# Patient Record
Sex: FEMALE | Race: WHITE | Employment: OTHER | URBAN - METROPOLITAN AREA
[De-identification: names, ages, dates, MRNs, and addresses within clinical notes are randomized per-mention and may not be internally consistent; named-entity substitution may affect disease eponyms.]

---

## 2017-02-17 PROBLEM — E11.9 CONTROLLED TYPE 2 DIABETES MELLITUS WITHOUT COMPLICATION, WITHOUT LONG-TERM CURRENT USE OF INSULIN (HCC): Status: ACTIVE | Noted: 2017-02-17

## 2017-06-30 PROBLEM — E11.9 CONTROLLED TYPE 2 DIABETES MELLITUS WITHOUT COMPLICATION, WITHOUT LONG-TERM CURRENT USE OF INSULIN (HCC): Status: RESOLVED | Noted: 2017-02-17 | Resolved: 2017-06-30

## 2018-02-23 NOTE — H&P
>      Patient:  Avila Fodr  YOB: 1945   Date:                       02/01/2018 1:15 PM   Visit Type:                 Office Visit        This 68year old female presents for GERD, IBS, history of colon polyp and history of gastric polyp. History of Present Illness:  1. GERD   2. IBS   3.  history of colon polyp   4.  history of gastric polyp   Ms. Prince Quick is a 72-year-old female with a past medical history of diabetes, obesity, chronic kidney disease stage III, asthma, hypertension and hyperlipidemia followed long-term by Dr. Giuseppe Koroma for IBS and GERD. She also has a history of colon polyps and gastric polyps. Her last colonoscopy done in December 2013 revealed diverticulosis and colon polyps. EGD in 2012 revealed a large hyperplastic polyp. She is due for both surveillance EGD and colonoscopy this month. The patient states that over the past few months she has been having increased issues with bloating which has been made better by eating [place]. She recently stopped taking omeprazole 5 or 6 weeks ago because of stage III kidney disease and now she is taking only Zantac 150 b.i.d. for GERD symptoms. She is having lots of breakthrough heartburn and eating \"lots of TUMS\". she does have to do a two-day prepwith her colonoscopies and has had problems with low blood pressure and passing out with previous preps. PROBLEM LIST:     Problem Description Onset Date Chronic Clinical Status Notes   Gastro-esophageal reflux disease without esophagitis 01/19/2013 Y  GERD, chronic before 2000. Prilosec 40 mg Jan 2014. IBS - Irritable bowel syndrome 01/19/2013 Y  IBS since 2000. Postprandial diarrhea. Levbid helps. Colonic polyps 01/19/2013 Y  Hx colon polyps. (Had hypotension, dizzy with 2013 colon prep). Next colon 2015 w white rice diet 2 day prior, then full prep day prior, pediscope and APC backup and Miralax prep (limit the salt intake due to renal disease).    Colonic diverticulitis 01/19/2013 Y    Hx diverticulitis. July 2002. Sept 2012. Aug 2013. Multiple gastric polyps 08/28/2014 N           PAST MEDICAL/SURGICAL HISTORY   (Detailed)    Disease/disorder Onset Date Management Date Comments   Colonoscopy 12-5-13 (Hx polyps)  Dr Regan Bowman 2013 Tacked sigmoid, diverticulosis, 6 mm TA polyp (R colon and transverse colon), 6 mm HP rectal polyp. Cecal area clear. Colonoscopy 11-30-12 (LLQP Hx diverticulitis)  Dr Regan Bowman 2012 Tacked sigmoid w diverticulosis. Flat 8 mm TA polyp behind ICV and two other R colon TA polyps. EGD 11-30-12 (GERD)  Dr Regan Bowman 2012 Gastric HP polyps. Colonoscopy 3-31-09 (Hx polyps)  Dr Regan Bowman 2009 Diverticulosis, tacked sigmoid o/w (-). Colonoscopy 1-3-07 (Hx polyps)  Dr Regan Bowman 2007 Diverticulosis, HP and TA polyp. EGD 1-3-07 (GERD)  Dr Regan Bowman 2007 Normal.   CKD, stage III Proteinuria  Dr Denice Rahman 2006    Colonoscopy 11-30-04 (Hx polyps)  Dr Regan Bowman 2004 Diverticulosis, 2 TA polyps. Colonoscopy 11-22-02 (abd pain)  Dr Regan Bowman 2002 Diverticulosis, 3 small TA polyps. Lymph Node resection (neck)   2002 Benign reactive lymphadenopathy (after skin allergy flare up)   Endometriosis   1982 ASHWINI, BSO. Hyperlipidemia       Hypertension       L breast lumpectomy x 3    Before 2002. Diabetes mellitus, type 2  Dr Olivier [facility] 08/28/2014 -Better control with new meds (summer 2014). EGD 2-2-16 (dysphagia, GERD)    Large fundic gland polyp (removed by Bx the stalk). Meribeth Colon #50: no tear. Asthma       Colonoscopy 2-5-16 (Hx polyps)  Dr Regan Bowman  Diverticulosis; 3 polyps (TA, serrated polyp). Nephrolithiasis       Osteoarthritis       Skin Rashes    Patient believes from multiple food or environmental allergies: associated abd cramping pain, diarrhea.    Procedure History  Test Date Results Interp   COLONOSCOPY 11/30/2012 Diverticulosis, Colon polyps    EGD 11/30/2012 Gastric polyp            CURRENT MEDICATIONS  Medication Name Sig Desc   Lantus 100 unit/mL subcutaneous solution INJECT 50 UNITS NIGHTLY   Novolog 100 unit/mL subcutaneous solution INJECTED PER SLIDING SCALE   Tranxene T-Tab 7.5 mg tablet TAKE AS DIRECTED   Vitamin D3 2,000 unit tablet TAKE 1 TABLET DAILY   Zyrtec 10 mg tablet take 1 tablet by oral route  every day   atorvastatin take 1 tablet by oral route  every day   Symbicort inhale 2 puff by inhalation route 2 times every day in the morning and evening   omeprazole 40 mg capsule,delayed release take 1 capsule by oral route  every day before a meal   losartan 100 mg tablet take 1 tablet by oral route  every day   Victoza 2-Patric 0.6 mg/0.1 mL (18 mg/3 mL) subcutaneous pen injector inject 1.8 milliliter by subcutaneous route  every day   glipizide 5 mg tablet take 1 tablet by oral route  every day before meals   Bystolic 10 mg tablet take 1 Tablet by oral route  every day   Patanase 0.6 % nasal spray use as needed. Probiotic take 1 tablet daily. Tylenol Ex Str Arthritis Pain 500 mg tablet monet 2 tablets daily. gemfibrozil 600 mg tablet take 1 tablet by oral route 2 times every day 30 minutes before morning and evening meal   estradiol 1 mg tablet take 1/2 tablet daily. aspirin 81 mg chewable tablet chew 1 tablet (81MG)  by oral route  every day   MULTIVITAMIN 1 PO DAILY     Medication Reconciliation  Medications reconciled today. ALLERGIES  Ingredient Reaction Medication Name Comment   LOVASTATIN   ALTOCOR. LATEX      STATINS-HMG-COA REDUCTASE INHIBITORS   STATIN DRUGS   [employer]   MULTIPLE. MULTIPLE FOOD AND ENVIRONMENTAL ALLERGIES   Reviewed, updated. Family History  (Detailed)  Relationship  Age at Death Condition Onset Age Cause of Death   Father   Cancer, renal cell  N   Mother   Hypertension  N   Mother   Stroke  N         Social History:  (Detailed)  Preferred language is English. MARITAL STATUS/FAMILY/SOCIAL SUPPORT  Currently .       SMOKING STATUS  Use Status Type Smoking Status Usage Per Day Years Used Total Pack Years no/never  Never smoker      no/never  Never smoker          HOME ENVIRONMENT/SAFETY  The patient has not fallen in the last year. Review of Systems  System Neg/Pos Details   GI Positive Abdominal pain, Constipation, Diarrhea, Heartburn, Reflux. All other review of systems are negative. VITAL SIGNS  BP mm/Hg Pulse/min Resp/min Temp F Height (Total in.) Weight (lbs.) BMI   120/68 88 24  59.00 175.20 35.39     PHYSICAL EXAM    GENERAL: A well nourished, well hydrated patient who appears the stated age. SKIN: Extremities and face reveal no rashes. HEENT: Sclerae anicteric. No oral ulcerations or abnormal pigment of the lips. Pupils equal and reactive to light. CARDIOVASCULAR: Regular rate and rhythm. No murmurs, gallops or rubs. RESPIRATORY: Comfortable breathing with no accessory muscle use. Clear breath sounds with no rales or wheezes. GI: The abdomen is soft, nondistended, and nontender. Normal active bowel sounds. No enlargement of the liver or spleen. No masses palpable. Obese  RECTAL: Deferred. MUSCULOSKELETAL: Gait appears steady. NEUROLOGICAL: Gross memory appears intact. Patient is alert and oriented. PSYCHIATRIC: Mood appears appropriate with judgement intact. Medications Prescribed/Renewed (this encounter)  Medication Directions   sucralfate 100 mg/mL oral suspension take 10 milliliter by oral route 4 times every day on an empty stomach 1 hour before meals and at bedtime       Assessment/Plan  # Detail Type Description    1. Assessment Personal history of colonic polyps (Z86.010). Plan Orders Further diagnostic evaluations ordered today include(s) Colonoscopy to be performed. 2. Assessment History of gastric polyp (Z87.19). 3. Assessment Chronic GERD (K21.9). 4. Assessment Irritable bowel syndrome, unspecified type (K58.9). Provider Plan the patient is a 63-year-old female with multiple medical issues followed by Dr. Chet Paige for IBS and GERD.  She's currently due for both EGD and colonoscopy because of a history of polyps in both stomach and colon. we will schedule EGD and colonoscopy with a 2 day clear liquid prep, early a.m. case to be done at the hospital because of previous issues with hypotension. We will give her a trial of Carafate a.c. and h.s. for 2 weeks to take in addition to the Zantac b.i.d.. GERD handout provided. I have discussed the risks and benefits of the procedure including but not limited to bleeding, infection, injury, or aggravation of underlying medical issues. The pt voices understanding and agrees with proceeding. Further recommendations pending endoscopic findings and clinical course. I have discussed the need to avoid anti inflammatory medications, vit E, or fish oil for 5 days prior to the procedure. Medications have been reviewed with adjustment of any potential diabetic meds or prescriptions blood thinner as applicable. ASA class III. COUNSELING / EDUCATIONAL FACTORS:  Items reviewed / discussed during today's visit: prior testing / procedures were reviewed; testing was discussed in detail; old records were reviewed; indications and risks of the procedure were discussed; advised to follow up if symptoms worsen or do not completely resolve. Patient expressed understanding of instructions. The patient was checked out at 1:43 PM by Kena Martins. Elements of this note may have been dictated using speech recognition software. As a result, errors of speech recognition may have occurred.       Provider:      Eleazar Wilkerson 02/01/2018 1:49 PM   Document generated by: Gretchen Garcia 02/01/2018    CC Providers:  [facility] ([facility])  [address] [place] [address] MD Alban Zamorano Endocrinology  [facility], [facility] [address] [address] [place] [address]. [place] [address] [facility]  [place], [place] [address]-        Electronically signed by Simi Hogan.  Josh Yepez on 02/01/2018 01:49 PM

## 2018-02-26 ENCOUNTER — ANESTHESIA EVENT (OUTPATIENT)
Dept: ENDOSCOPY | Age: 73
End: 2018-02-26
Payer: MEDICARE

## 2018-02-26 RX ORDER — SODIUM CHLORIDE, SODIUM LACTATE, POTASSIUM CHLORIDE, CALCIUM CHLORIDE 600; 310; 30; 20 MG/100ML; MG/100ML; MG/100ML; MG/100ML
100 INJECTION, SOLUTION INTRAVENOUS CONTINUOUS
Status: CANCELLED | OUTPATIENT
Start: 2018-02-26

## 2018-02-26 RX ORDER — SODIUM CHLORIDE 0.9 % (FLUSH) 0.9 %
5-10 SYRINGE (ML) INJECTION AS NEEDED
Status: CANCELLED | OUTPATIENT
Start: 2018-02-26

## 2018-02-27 ENCOUNTER — HOSPITAL ENCOUNTER (OUTPATIENT)
Age: 73
Setting detail: OUTPATIENT SURGERY
Discharge: HOME | End: 2018-02-27
Attending: INTERNAL MEDICINE | Admitting: INTERNAL MEDICINE
Payer: MEDICARE

## 2018-02-27 ENCOUNTER — ANESTHESIA (OUTPATIENT)
Dept: ENDOSCOPY | Age: 73
End: 2018-02-27
Payer: MEDICARE

## 2018-02-27 VITALS
BODY MASS INDEX: 34.27 KG/M2 | RESPIRATION RATE: 18 BRPM | WEIGHT: 170 LBS | SYSTOLIC BLOOD PRESSURE: 138 MMHG | HEIGHT: 59 IN | OXYGEN SATURATION: 96 % | HEART RATE: 61 BPM | TEMPERATURE: 97.7 F | DIASTOLIC BLOOD PRESSURE: 75 MMHG

## 2018-02-27 LAB — GLUCOSE BLD STRIP.AUTO-MCNC: 209 MG/DL (ref 65–100)

## 2018-02-27 PROCEDURE — 82962 GLUCOSE BLOOD TEST: CPT

## 2018-02-27 PROCEDURE — 74011250636 HC RX REV CODE- 250/636: Performed by: ANESTHESIOLOGY

## 2018-02-27 PROCEDURE — 76040000025: Performed by: INTERNAL MEDICINE

## 2018-02-27 PROCEDURE — 77030009426 HC FCPS BIOP ENDOSC BSC -B: Performed by: INTERNAL MEDICINE

## 2018-02-27 PROCEDURE — 74011250636 HC RX REV CODE- 250/636

## 2018-02-27 PROCEDURE — 74011000250 HC RX REV CODE- 250

## 2018-02-27 PROCEDURE — 88305 TISSUE EXAM BY PATHOLOGIST: CPT | Performed by: INTERNAL MEDICINE

## 2018-02-27 PROCEDURE — 76060000032 HC ANESTHESIA 0.5 TO 1 HR: Performed by: INTERNAL MEDICINE

## 2018-02-27 RX ORDER — PROPOFOL 10 MG/ML
INJECTION, EMULSION INTRAVENOUS
Status: DISCONTINUED | OUTPATIENT
Start: 2018-02-27 | End: 2018-02-27 | Stop reason: HOSPADM

## 2018-02-27 RX ORDER — SODIUM CHLORIDE, SODIUM LACTATE, POTASSIUM CHLORIDE, CALCIUM CHLORIDE 600; 310; 30; 20 MG/100ML; MG/100ML; MG/100ML; MG/100ML
100 INJECTION, SOLUTION INTRAVENOUS CONTINUOUS
Status: DISCONTINUED | OUTPATIENT
Start: 2018-02-27 | End: 2018-02-27 | Stop reason: HOSPADM

## 2018-02-27 RX ORDER — PROPOFOL 10 MG/ML
INJECTION, EMULSION INTRAVENOUS AS NEEDED
Status: DISCONTINUED | OUTPATIENT
Start: 2018-02-27 | End: 2018-02-27 | Stop reason: HOSPADM

## 2018-02-27 RX ORDER — LIDOCAINE HYDROCHLORIDE 20 MG/ML
INJECTION, SOLUTION EPIDURAL; INFILTRATION; INTRACAUDAL; PERINEURAL AS NEEDED
Status: DISCONTINUED | OUTPATIENT
Start: 2018-02-27 | End: 2018-02-27 | Stop reason: HOSPADM

## 2018-02-27 RX ADMIN — SODIUM CHLORIDE, SODIUM LACTATE, POTASSIUM CHLORIDE, AND CALCIUM CHLORIDE 100 ML/HR: 600; 310; 30; 20 INJECTION, SOLUTION INTRAVENOUS at 10:13

## 2018-02-27 RX ADMIN — PROPOFOL 200 MCG/KG/MIN: 10 INJECTION, EMULSION INTRAVENOUS at 10:43

## 2018-02-27 RX ADMIN — PROPOFOL 50 MG: 10 INJECTION, EMULSION INTRAVENOUS at 10:43

## 2018-02-27 RX ADMIN — LIDOCAINE HYDROCHLORIDE 60 MG: 20 INJECTION, SOLUTION EPIDURAL; INFILTRATION; INTRACAUDAL; PERINEURAL at 10:43

## 2018-02-27 NOTE — ROUTINE PROCESS
Pt. Discharged to car by [place] with  . Vital signs stable. Able to tolerate PO fluids. Passing gas.  Seen by MD.

## 2018-02-27 NOTE — ANESTHESIA PREPROCEDURE EVALUATION
Anesthetic History               Review of Systems / Medical History  Patient summary reviewed and pertinent labs reviewed    Pulmonary            Asthma : well controlled       Neuro/Psych         Psychiatric history (anxiety)     Cardiovascular    Hypertension          Hyperlipidemia  Pertinent negatives: No angina    Comments: 3 mets   GI/Hepatic/Renal     GERD    Renal disease: CRI       Endo/Other    Diabetes    Obesity     Other Findings              Physical Exam    Airway  Mallampati: III  TM Distance: 4 - 6 cm  Neck ROM: normal range of motion        Cardiovascular    Rhythm: regular  Rate: normal      Pertinent negatives: No murmur, JVD and peripheral edema   Dental         Pulmonary  Breath sounds clear to auscultation               Abdominal         Other Findings            Anesthetic Plan    ASA: 3  Anesthesia type: total IV anesthesia          Induction: Intravenous  Anesthetic plan and risks discussed with: Patient

## 2018-02-27 NOTE — DISCHARGE INSTRUCTIONS
Gastrointestinal Esophagogastroduodenoscopy (EGD) - Upper Exam Discharge Instructions    1. Call Dr. Yamil Dow at [phone] for any problems or questions. 2. Contact the doctor's office for follow up appointment as directed. 3. Medication may cause drowsiness for several hours, therefore, do not drive or                  operate machinery for remainder of the day. 4. No alcohol today. 5. Ordinarily, you may resume regular diet and activity after exam unless otherwise              specified by your physician. 6. For mild soreness in your throat you may use Cepacol throat lozenges or warm               salt-water gargles as needed. Gastrointestinal Colonoscopy/Flexible Sigmoidoscopy - Lower Exam Discharge Instructions  1. Call Dr. Yamil Dow at [phone] for any problems or questions. 2. Contact the doctors office for follow up appointment as directed  3. Medication may cause drowsiness for several hours, therefore, do not drive or operate machinery for remainder of the day. 4. No alcohol today. 5. Ordinarily, you may resume regular diet and activity after exam unless otherwise specified by your physician. 6. Because of air put into your colon during exam, you may experience some abdominal distension, relieved by the passage of gas, for several hours. 7. Contact your physician if you have any of the following:  a. Excessive amount of bleeding - large amount when having a bowel movement. Occasional specks of blood with bowel movement would not be unusual.  b. Severe abdominal pain  c. Fever or Chills  8.   a. Resume regular diet   b. Take Metamucil - 1 tablespoon in juice every morning for 3 days if you have it.  c. No Aspirin, Advil, Aleve, Nuprin, Ibuprofen, or medications that contain these drugs for 2 weeks. OK to continue baby aspirin. Any additional instructions:  Office will notify with with 3 month appointment to see Dr. Wilfred Contreras.       Instructions given to [place] and .   Instructions given by:  Juan C Chavez RN

## 2018-02-27 NOTE — PROCEDURES
COLONOSCOPY    DATE of PROCEDURE: 2/27/2018    PT NAME: Rachid Mendieta     [id number]    MEDICATION:   MAC    INSTRUMENT: CFHQ 190 L    SPECIAL PROCEDURE: cold bx  PREP: good  BLOOD LOSS- 0 or min. SPEC- yes  IMPLANTS- NONE  PROCEDURE: standard colonoscopy to cecum     ASSESSMENT:  1. Minor transverse colon polyp- bx  2. Left colon Diverticulosis  3. Small Internal Hemorrhoids    PLAN:  1. F/U 3 months - Dr. Maritza Schaumann  2. Check path  3.  Surveillance 5/ 10yrs    Amador Boyce MD

## 2018-02-27 NOTE — PROCEDURES
ESOPHAGOGASTRODUODENOSCOPY    DATE of PROCEDURE: 2/27/2018    PT NAME: Tee Garcia     [id number]    MEDICATION:   MAC        INSTRUMENT: GIFH 190    SPECIAL PROCEDURE: none  BLOOD LOSS- 0 or min. SPEC- no  IMPLANT- none    PROCEDURE:  Standard EGD      ASSESSMENT:  1. asymptomatic Schatzki's ring  2. Small HH    PLAN:   1.  Elly Ogden MD

## 2018-02-27 NOTE — INTERVAL H&P NOTE
H&P Update:  Rudy Arenas was seen and examined. History and physical has been reviewed. The patient has been examined.  There have been no significant clinical changes since the completion of the originally dated History and Physical.    Signed By: Pretty Monroe MD     February 27, 2018 10:39 AM

## 2018-02-27 NOTE — IP AVS SNAPSHOT
Mirlande Crystal 
 
 
 [address] [facility] [address] [facility] 
[phone] Patient: Tee Garcia MRN: [id number] SKB:[birth year] About your hospitalization You were admitted on:  February 27, 2018 You last received care in the:  [facility] You were discharged on:  February 27, 2018 Why you were hospitalized Your primary diagnosis was:  Not on File Follow-up Information None Your Scheduled Appointments Wednesday March 14, 2018 10:45 AM EDT Follow Up with Gita Kolb MD  
[facility] ENDOCRINOLOGY) [address] [facility] [id number] [phone] Discharge Orders None A check nellie indicates which time of day the medication should be taken. My Medications ASK your doctor about these medications Instructions Each Dose to Equal  
 Morning Noon Evening Bedtime  
 aspirin 81 mg chewable tablet Your last dose was: Your next dose is: Take 81 mg by mouth nightly. Last dose 2/24/18  
 81 mg  
    
   
   
   
  
 atorvastatin 20 mg tablet Commonly known as:  LIPITOR Your last dose was: Your next dose is: Take 1 Tab by mouth daily. 20 mg  
    
   
   
   
  
 budesonide-formoterol 160-4.5 mcg/actuation Hfaa Commonly known as:  SYMBICORT Your last dose was: Your next dose is: Take 2 Puffs by inhalation daily. 2 Puff CARAFATE 1 gram tablet Generic drug:  sucralfate Your last dose was: Your next dose is: Take 2 g by mouth two (2) times a day. 2 g  
    
   
   
   
  
 cetirizine 10 mg tablet Commonly known as:  ZYRTEC Your last dose was: Your next dose is:    
   
   
 as needed  
     
   
   
   
  
 cholecalciferol 400 unit Tab tablet Commonly known as:  VITAMIN D3  
   
 Your last dose was: Your next dose is: Take 2,000 Units by mouth daily. 2000 Units  
    
   
   
   
  
 clorazepate 7.5 mg tablet Commonly known as:  TRANXENE Your last dose was: Your next dose is: Take 7.5 mg by mouth three (3) times daily. Pt takes 1/2 tab at HS Rarely takes daytime 7.5 mg  
    
   
   
   
  
 colchicine 0.6 mg tablet Your last dose was: Your next dose is: TAKE 2 TABLETS BY MOUTH INITIALLY AT FIRST SIGN OF GOUT FLARE THEN TAKE ONE TABLET ONE HOUR LATER-last dose 12/2017  
     
   
   
   
  
 estradiol 1 mg tablet Commonly known as:  ESTRACE Your last dose was: Your next dose is: Take 1 mg by mouth nightly. 1 mg FARXIGA 5 mg Tab tablet Generic drug:  dapagliflozin Your last dose was: Your next dose is: Take 1 Tab by mouth daily. 5 mg  
    
   
   
   
  
 gemfibrozil 600 mg tablet Commonly known as:  LOPID Your last dose was: Your next dose is: Take 1 Tab by mouth two (2) times a day. 600 mg  
    
   
   
   
  
 glipiZIDE SR 5 mg CR tablet Commonly known as:  GLUCOTROL XL Your last dose was: Your next dose is: Take 1 Tab by mouth two (2) times a day. 5 mg LANTUS SOLOSTAR U-100 INSULIN 100 unit/mL (3 mL) Inpn Generic drug:  insulin glargine Your last dose was: Your next dose is:    
   
   
 60 Units by SubCUTAneous route nightly. 60 Units  
    
   
   
   
  
 losartan 100 mg tablet Commonly known as:  COZAAR Your last dose was: Your next dose is: Take 1 Tab by mouth daily. 100 mg  
    
   
   
   
  
 multivitamin tablet Commonly known as:  ONE A DAY Your last dose was: Your next dose is: Take 1 Tab by mouth every morning. 1 Tab nebivolol 10 mg tablet Commonly known as:  BYSTOLIC Your last dose was: Your next dose is: Take 1 Tab by mouth daily. 10 mg NovoLOG Flexpen U-100 Insulin 100 unit/mL Inpn Generic drug:  insulin aspart U-100 Your last dose was: Your next dose is:    
   
   
 2 units per 50 >150 AC and HS (up to 30 units per day) olopatadine 0.6 % Spry Commonly known as:  PATANASE Your last dose was: Your next dose is:    
   
   
 1 [place] by Nasal route nightly. 1 [place] PROAIR HFA 90 mcg/actuation inhaler Generic drug:  albuterol Your last dose was: Your next dose is:    
   
   
 USE 2 PUFFS PRN BID OR Q 4 H  
     
   
   
   
  
 PROBIOTIC 4X 10-15 mg Tbec Generic drug:  B.infantis-B.ani-B.long-B.bifi Your last dose was: Your next dose is: Take  by mouth nightly. raNITIdine 150 mg tablet Commonly known as:  ZANTAC Your last dose was: Your next dose is: Take 300 mg by mouth two (2) times a day. 300 mg STOOL SOFTENER PO Your last dose was: Your next dose is: Take  by mouth nightly. TYLENOL ARTHRITIS PAIN 650 mg [place] Generic drug:  acetaminophen Your last dose was: Your next dose is: Take 650 mg by mouth every six (6) hours as needed for Pain. 650 mg  
    
   
   
   
  
 VICTOZA 3-CATRACHITO 0.6 mg/0.1 mL (18 mg/3 mL) Pnij Generic drug:  Liraglutide Your last dose was: Your next dose is:    
   
   
 1.8 mg by SubCUTAneous route daily. 1.8 mg Discharge Instructions Gastrointestinal Esophagogastroduodenoscopy (EGD) - Upper Exam Discharge Instructions 1. Call Dr. Armstrong Laycharley at [phone] for any problems or questions. 2. Contact the doctor's office for follow up appointment as directed. 3. Medication may cause drowsiness for several hours, therefore, do not drive or                  operate machinery for remainder of the day. 4. No alcohol today. 5. Ordinarily, you may resume regular diet and activity after exam unless otherwise              specified by your physician. 6. For mild soreness in your throat you may use Cepacol throat lozenges or warm               salt-water gargles as needed. Gastrointestinal Colonoscopy/Flexible Sigmoidoscopy - Lower Exam Discharge Instructions 1. Call Dr. Antonia Figueroa at [phone] for any problems or questions. 2. Contact the doctors office for follow up appointment as directed 3. Medication may cause drowsiness for several hours, therefore, do not drive or operate machinery for remainder of the day. 4. No alcohol today. 5. Ordinarily, you may resume regular diet and activity after exam unless otherwise specified by your physician. 6. Because of air put into your colon during exam, you may experience some abdominal distension, relieved by the passage of gas, for several hours. 7. Contact your physician if you have any of the following: 
a. Excessive amount of bleeding  large amount when having a bowel movement. Occasional specks of blood with bowel movement would not be unusual. 
b. Severe abdominal pain 
c. Fever or Chills 8.  
a. Resume regular diet  
b. Take Metamucil  1 tablespoon in juice every morning for 3 days if you have it. 
c. No Aspirin, Advil, Aleve, Nuprin, Ibuprofen, or medications that contain these drugs for 2 weeks. OK to continue baby aspirin. Any additional instructions:  Office will notify with with 3 month appointment to see Dr. Cinda Suh. Instructions given to [employer] and . Instructions given by:  Otilia Sullivan RN Introducing [facility]!    
 [facility] [employer] introduces [employer] patient portal. Now you can access parts of your medical record, email your doctor's office, and request medication refills online. 1. In your internet browser, go to [email or web address] 2. Click on the First Time User? Click Here link in the Sign In box. You will see the New Member Sign Up page. 3. Enter your [employer] Access Code exactly as it appears below. You will not need to use this code after youve completed the sign-up process. If you do not sign up before the expiration date, you must request a new code. · [employer] Access Code: [email or web address] Expires: 4/11/2018  9:22 AM 
 
4. Enter the last four digits of your Social Security Number (xxxx) and Date of Birth (mm/dd/yyyy) as indicated and click Submit. You will be taken to the next sign-up page. 5. Create a [employer] ID. This will be your [employer] login ID and cannot be changed, so think of one that is secure and easy to remember. 6. Create a [employer] password. You can change your password at any time. 7. Enter your Password Reset Question and Answer. This can be used at a later time if you forget your password. 8. Enter your e-mail address. You will receive e-mail notification when new information is available in [address] 9. Click Sign Up. You can now view and download portions of your medical record. 10. Click the Download Summary menu link to download a portable copy of your medical information. If you have questions, please visit the Frequently Asked Questions section of the [employer] website. Remember, [employer] is NOT to be used for urgent needs. For medical emergencies, dial 911. Now available from your iPhone and Android! Providers Seen During Your Hospitalization Provider Specialty Primary office phone Rody Naik MD Gastroenterology [phone] Your Primary Care Physician (PCP) Primary Care Physician Office Phone Office Fax [address] [facility] You are allergic to the following Allergen Reactions Fenofibrate,Micronized Other (comments) No energy Niacin Preparations Other (comments) Elevated blood sugar Omega-3 Acid Ethyl Esters Other (comments) Weak, blood sugar elevated Pravastatin Other (comments) Muscle aches, constipation Rosuvastatin Other (comments)  
 welps on face Humalog (Insulin Lispro) Rash Itching Naproxen Sodium Other (comments) Reduced kidney function Simvastatin Myalgia Myalgias, fatigue Statins-Hmg-Coa Reductase Inhibitors Hives With lethargy, difficulty with swallow. Takes generic lipitor but states she is ok with low doses Recent Documentation Height Weight Breastfeeding? BMI OB Status Smoking Status 1.505 m 77.1 kg No 34.05 kg/m2 Hysterectomy Never Smoker Emergency Contacts Name Discharge Info Relation Home Work Mobile [address] CAREGIVER [3] Spouse [3] [phone] Patient Belongings The following personal items are in your possession at time of discharge: 
  Dental Appliances: None  Visual Aid: None Please provide this summary of care documentation to your next provider. Signatures-by signing, you are acknowledging that this After Visit Summary has been reviewed with you and you have received a copy. Patient Signature:  ____________________________________________________________ Date:  ____________________________________________________________  
  
Jenny Morillo Provider Signature:  ____________________________________________________________ Date:  ____________________________________________________________

## 2018-02-28 NOTE — ANESTHESIA POSTPROCEDURE EVALUATION
Post-Anesthesia Evaluation and Assessment    Patient: Rudy Arenas MRN: [id number]  SSN: [id number]    YOB: 1945  Age: 68 y.o. Sex: female       Cardiovascular Function/Vital Signs  Visit Vitals    /75    Pulse 61    Temp 36.5 °C (97.7 °F)    Resp 18    Ht 4' 11.25\" (1.505 m)    Wt 77.1 kg (170 lb)    SpO2 96%    Breastfeeding No    BMI 34.05 kg/m2       Patient is status post total IV anesthesia anesthesia for Procedure(s):  ESOPHAGOGASTRODUODENOSCOPY (EGD)  COLONOSCOPY/ 35  ENDOSCOPIC POLYPECTOMY. Nausea/Vomiting: None    Postoperative hydration reviewed and adequate. Pain:  Pain Scale 1: Numeric (0 - 10) (02/27/18 1149)  Pain Intensity 1: 0 (02/27/18 1149)   Managed    Neurological Status: At baseline    Mental Status and Level of Consciousness: Arousable    Pulmonary Status:   O2 Device: Room air (02/27/18 1142)   Adequate oxygenation and airway patent    Complications related to anesthesia: None    Post-anesthesia assessment completed.  No concerns    Signed By: Jessica Guevara MD     February 28, 2018

## 2018-03-12 PROBLEM — D50.9 IRON DEFICIENCY ANEMIA: Status: ACTIVE | Noted: 2018-03-12

## 2018-05-22 ENCOUNTER — APPOINTMENT (RX ONLY)
Dept: URBAN - METROPOLITAN AREA CLINIC 24 | Facility: CLINIC | Age: 73
Setting detail: DERMATOLOGY
End: 2018-05-22

## 2018-05-22 DIAGNOSIS — D18.0 HEMANGIOMA: ICD-10-CM

## 2018-05-22 DIAGNOSIS — L82.1 OTHER SEBORRHEIC KERATOSIS: ICD-10-CM

## 2018-05-22 PROBLEM — D23.39 OTHER BENIGN NEOPLASM OF SKIN OF OTHER PARTS OF FACE: Status: ACTIVE | Noted: 2018-05-22

## 2018-05-22 PROBLEM — D18.01 HEMANGIOMA OF SKIN AND SUBCUTANEOUS TISSUE: Status: ACTIVE | Noted: 2018-05-22

## 2018-05-22 PROCEDURE — 99214 OFFICE O/P EST MOD 30 MIN: CPT

## 2018-05-22 PROCEDURE — ? OTHER

## 2018-05-22 PROCEDURE — ? COUNSELING

## 2018-05-22 ASSESSMENT — LOCATION DETAILED DESCRIPTION DERM
LOCATION DETAILED: RIGHT SUPERIOR UPPER BACK
LOCATION DETAILED: LEFT SUPERIOR UPPER BACK

## 2018-05-22 ASSESSMENT — LOCATION SIMPLE DESCRIPTION DERM
LOCATION SIMPLE: RIGHT UPPER BACK
LOCATION SIMPLE: LEFT UPPER BACK

## 2018-05-22 ASSESSMENT — LOCATION ZONE DERM: LOCATION ZONE: TRUNK

## 2018-05-22 NOTE — PROCEDURE: OTHER
Note Text (......Xxx Chief Complaint.): This diagnosis correlates with the
Other (Free Text): Pt denies symptoms, but if she wishes to have removed we will send to ocular plastics.
Detail Level: Zone

## 2018-10-23 ENCOUNTER — APPOINTMENT (RX ONLY)
Dept: URBAN - METROPOLITAN AREA CLINIC 24 | Facility: CLINIC | Age: 73
Setting detail: DERMATOLOGY
End: 2018-10-23

## 2018-10-23 DIAGNOSIS — D485 NEOPLASM OF UNCERTAIN BEHAVIOR OF SKIN: ICD-10-CM

## 2018-10-23 DIAGNOSIS — M72.1 KNUCKLE PADS: ICD-10-CM

## 2018-10-23 DIAGNOSIS — L72.0 EPIDERMAL CYST: ICD-10-CM

## 2018-10-23 PROBLEM — D23.39 OTHER BENIGN NEOPLASM OF SKIN OF OTHER PARTS OF FACE: Status: ACTIVE | Noted: 2018-10-23

## 2018-10-23 PROBLEM — D48.5 NEOPLASM OF UNCERTAIN BEHAVIOR OF SKIN: Status: ACTIVE | Noted: 2018-10-23

## 2018-10-23 PROCEDURE — ? DIAGNOSIS COMMENT

## 2018-10-23 PROCEDURE — 99213 OFFICE O/P EST LOW 20 MIN: CPT | Mod: 25

## 2018-10-23 PROCEDURE — 11301 SHAVE SKIN LESION 0.6-1.0 CM: CPT

## 2018-10-23 PROCEDURE — ? PRESCRIPTION

## 2018-10-23 PROCEDURE — ? SHAVE REMOVAL

## 2018-10-23 PROCEDURE — ? COUNSELING

## 2018-10-23 RX ORDER — UREA 40 %
CREAM (GRAM) TOPICAL
Qty: 1 | Refills: 4 | COMMUNITY
Start: 2018-10-23

## 2018-10-23 RX ADMIN — Medication: at 00:00

## 2018-10-23 ASSESSMENT — LOCATION ZONE DERM
LOCATION ZONE: LEG
LOCATION ZONE: HAND
LOCATION ZONE: LIP

## 2018-10-23 ASSESSMENT — LOCATION DETAILED DESCRIPTION DERM
LOCATION DETAILED: LEFT DISTAL CALF
LOCATION DETAILED: RIGHT DORSAL RING FINGER METACARPOPHALANGEAL JOINT
LOCATION DETAILED: RIGHT DORSAL INDEX FINGER METACARPOPHALANGEAL JOINT
LOCATION DETAILED: RIGHT INFERIOR VERMILION BORDER
LOCATION DETAILED: RIGHT DORSAL MIDDLE FINGER METACARPOPHALANGEAL JOINT

## 2018-10-23 ASSESSMENT — LOCATION SIMPLE DESCRIPTION DERM
LOCATION SIMPLE: RIGHT LOWER LIP
LOCATION SIMPLE: RIGHT HAND
LOCATION SIMPLE: LEFT CALF

## 2018-10-23 NOTE — PROCEDURE: SHAVE REMOVAL
Detail Level: Detailed
Bill For Surgical Tray: no
Wound Care: Vaseline
Was A Bandage Applied: Yes
Notification Instructions: Patient will be notified of biopsy results. However, patient instructed to call the office if not contacted within 2 weeks.
X Size Of Lesion In Cm (Optional): 0
Anesthesia Type: 1% lidocaine with epinephrine
Path Notes (To The Dermatopathologist): Please check margins.
Billing Type: Third-Party Bill
Medical Necessity Clause: This procedure was medically necessary because the lesion that was treated was:
Consent was obtained from the patient. The risks and benefits to therapy were discussed in detail. Specifically, the risks of infection, scarring, bleeding, prolonged wound healing, incomplete removal, allergy to anesthesia, nerve injury and recurrence were addressed. Prior to the procedure, the treatment site was clearly identified and confirmed by the patient. All components of Universal Protocol/PAUSE Rule completed.
Biopsy Method: Dermablade
Post-Care Instructions: I reviewed with the patient in detail post-care instructions. Patient is to keep the biopsy site dry overnight, and then apply Vaseline twice daily until healed. Patient may apply dilute white vinegar (1:10; white vinegar:water) soaks to remove any crusting.
Medical Necessity Information: It is in your best interest to select a reason for this procedure from the list below. All of these items fulfill various CMS LCD requirements except the new and changing color options.
Hemostasis: Aluminum Chloride
Size Of Lesion In Cm (Required): 1

## 2019-06-25 ENCOUNTER — APPOINTMENT (RX ONLY)
Dept: URBAN - METROPOLITAN AREA CLINIC 23 | Facility: CLINIC | Age: 74
Setting detail: DERMATOLOGY
End: 2019-06-25

## 2019-06-25 DIAGNOSIS — L72.0 EPIDERMAL CYST: ICD-10-CM

## 2019-06-25 DIAGNOSIS — L82.1 OTHER SEBORRHEIC KERATOSIS: ICD-10-CM

## 2019-06-25 DIAGNOSIS — M72.1 KNUCKLE PADS: ICD-10-CM

## 2019-06-25 DIAGNOSIS — L91.8 OTHER HYPERTROPHIC DISORDERS OF THE SKIN: ICD-10-CM

## 2019-06-25 DIAGNOSIS — L57.8 OTHER SKIN CHANGES DUE TO CHRONIC EXPOSURE TO NONIONIZING RADIATION: ICD-10-CM

## 2019-06-25 PROCEDURE — 99214 OFFICE O/P EST MOD 30 MIN: CPT

## 2019-06-25 PROCEDURE — ? PRESCRIPTION

## 2019-06-25 PROCEDURE — ? ADDITIONAL NOTES

## 2019-06-25 PROCEDURE — ? COUNSELING

## 2019-06-25 RX ORDER — UREA 40 G/100G
CREAM TOPICAL
Qty: 1 | Refills: 11 | Status: ERX

## 2019-06-25 ASSESSMENT — LOCATION SIMPLE DESCRIPTION DERM
LOCATION SIMPLE: LEFT BREAST
LOCATION SIMPLE: RIGHT FOREARM
LOCATION SIMPLE: RIGHT CHEEK
LOCATION SIMPLE: UPPER BACK
LOCATION SIMPLE: RIGHT HAND
LOCATION SIMPLE: LEFT HAND
LOCATION SIMPLE: LEFT FOREARM
LOCATION SIMPLE: LEFT CHEEK
LOCATION SIMPLE: LEFT CLAVICULAR SKIN

## 2019-06-25 ASSESSMENT — LOCATION ZONE DERM
LOCATION ZONE: HAND
LOCATION ZONE: ARM
LOCATION ZONE: FACE
LOCATION ZONE: TRUNK

## 2019-06-25 ASSESSMENT — LOCATION DETAILED DESCRIPTION DERM
LOCATION DETAILED: RIGHT SUPERIOR CENTRAL MALAR CHEEK
LOCATION DETAILED: LEFT DISTAL DORSAL FOREARM
LOCATION DETAILED: RIGHT DORSAL MIDDLE FINGER METACARPOPHALANGEAL JOINT
LOCATION DETAILED: LEFT SUPERIOR CENTRAL MALAR CHEEK
LOCATION DETAILED: RIGHT DORSAL RING FINGER METACARPOPHALANGEAL JOINT
LOCATION DETAILED: LEFT RADIAL DORSAL HAND
LOCATION DETAILED: RIGHT PROXIMAL DORSAL FOREARM
LOCATION DETAILED: SUPERIOR THORACIC SPINE
LOCATION DETAILED: RIGHT DORSAL INDEX FINGER METACARPOPHALANGEAL JOINT
LOCATION DETAILED: LEFT CLAVICULAR SKIN
LOCATION DETAILED: LEFT PROXIMAL DORSAL FOREARM
LOCATION DETAILED: LEFT INFRAMAMMARY CREASE (OUTER QUADRANT)

## 2019-07-22 PROBLEM — L30.4 INTERTRIGO: Status: ACTIVE | Noted: 2019-07-22

## 2020-12-29 ENCOUNTER — APPOINTMENT (RX ONLY)
Dept: URBAN - METROPOLITAN AREA CLINIC 24 | Facility: CLINIC | Age: 75
Setting detail: DERMATOLOGY
End: 2020-12-29

## 2020-12-29 DIAGNOSIS — L72.0 EPIDERMAL CYST: ICD-10-CM

## 2020-12-29 DIAGNOSIS — M72.1 KNUCKLE PADS: ICD-10-CM | Status: INADEQUATELY CONTROLLED

## 2020-12-29 DIAGNOSIS — L91.8 OTHER HYPERTROPHIC DISORDERS OF THE SKIN: ICD-10-CM

## 2020-12-29 DIAGNOSIS — L82.1 OTHER SEBORRHEIC KERATOSIS: ICD-10-CM

## 2020-12-29 DIAGNOSIS — D485 NEOPLASM OF UNCERTAIN BEHAVIOR OF SKIN: ICD-10-CM

## 2020-12-29 PROBLEM — J30.1 ALLERGIC RHINITIS DUE TO POLLEN: Status: ACTIVE | Noted: 2020-12-29

## 2020-12-29 PROBLEM — D48.5 NEOPLASM OF UNCERTAIN BEHAVIOR OF SKIN: Status: ACTIVE | Noted: 2020-12-29

## 2020-12-29 PROCEDURE — ? BIOPSY BY SHAVE METHOD

## 2020-12-29 PROCEDURE — 11102 TANGNTL BX SKIN SINGLE LES: CPT

## 2020-12-29 PROCEDURE — ? TREATMENT REGIMEN

## 2020-12-29 PROCEDURE — ? PRESCRIPTION

## 2020-12-29 PROCEDURE — ? COUNSELING

## 2020-12-29 PROCEDURE — 99213 OFFICE O/P EST LOW 20 MIN: CPT | Mod: 25

## 2020-12-29 RX ORDER — UREA 40 %
CREAM (GRAM) TOPICAL
Qty: 1 | Refills: 11 | Status: ERX

## 2020-12-29 ASSESSMENT — LOCATION ZONE DERM
LOCATION ZONE: TRUNK
LOCATION ZONE: HAND
LOCATION ZONE: FACE
LOCATION ZONE: ARM

## 2020-12-29 ASSESSMENT — LOCATION SIMPLE DESCRIPTION DERM
LOCATION SIMPLE: RIGHT BREAST
LOCATION SIMPLE: LEFT CHEEK
LOCATION SIMPLE: LEFT FOREARM
LOCATION SIMPLE: RIGHT CHEEK
LOCATION SIMPLE: RIGHT HAND
LOCATION SIMPLE: LEFT HAND
LOCATION SIMPLE: LEFT BREAST
LOCATION SIMPLE: LEFT UPPER ARM

## 2020-12-29 ASSESSMENT — LOCATION DETAILED DESCRIPTION DERM
LOCATION DETAILED: RIGHT DORSAL MIDDLE FINGER METACARPOPHALANGEAL JOINT
LOCATION DETAILED: RIGHT SUPERIOR CENTRAL MALAR CHEEK
LOCATION DETAILED: LEFT ANTERIOR PROXIMAL UPPER ARM
LOCATION DETAILED: RIGHT DORSAL INDEX FINGER METACARPOPHALANGEAL JOINT
LOCATION DETAILED: RIGHT MEDIAL BREAST 2-3:00 REGION
LOCATION DETAILED: LEFT INFRAMAMMARY CREASE (OUTER QUADRANT)
LOCATION DETAILED: LEFT SUPERIOR CENTRAL MALAR CHEEK
LOCATION DETAILED: LEFT RADIAL DORSAL HAND
LOCATION DETAILED: RIGHT DORSAL RING FINGER METACARPOPHALANGEAL JOINT
LOCATION DETAILED: LEFT DISTAL DORSAL FOREARM

## 2020-12-29 NOTE — PROCEDURE: BIOPSY BY SHAVE METHOD
Size Of Lesion In Cm: 0
Hide Anesthesia Volume?: No
Detail Level: Detailed
Anesthesia Type: 1% lidocaine with epinephrine
Anesthesia Volume In Cc: 0.5
Biopsy Method: Dermablade
Silver Nitrate Text: The wound bed was treated with silver nitrate after the biopsy was performed.
Billing Type: Third-Party Bill
Hemostasis: Aluminum Chloride
Depth Of Biopsy: dermis
Electrodesiccation And Curettage Text: The wound bed was treated with electrodesiccation and curettage after the biopsy was performed.
Electrodesiccation Text: The wound bed was treated with electrodesiccation after the biopsy was performed.
Wound Care: Vaseline
Type Of Destruction Used: Curettage
Cryotherapy Text: The wound bed was treated with cryotherapy after the biopsy was performed.
Consent: Written consent was obtained and risks were reviewed including but not limited to scarring, infection, bleeding, scabbing, incomplete removal, nerve damage and allergy to anesthesia.
Was A Bandage Applied: Yes
Notification Instructions: Patient will be notified of biopsy results. However, patient instructed to call the office if not contacted within 2 weeks.
Information: Selecting Yes will display possible errors in your note based on the variables you have selected. This validation is only offered as a suggestion for you. PLEASE NOTE THAT THE VALIDATION TEXT WILL BE REMOVED WHEN YOU FINALIZE YOUR NOTE. IF YOU WANT TO FAX A PRELIMINARY NOTE YOU WILL NEED TO TOGGLE THIS TO 'NO' IF YOU DO NOT WANT IT IN YOUR FAXED NOTE.
Path Notes (To The Dermatopathologist): .
Curettage Text: The wound bed was treated with curettage after the biopsy was performed.
Accession #: [id number]
Biopsy Type: H and E
Post-Care Instructions: I reviewed with the patient in detail post-care instructions. Patient is to keep the biopsy site dry overnight, and then apply bacitracin twice daily until healed. Patient may apply hydrogen peroxide soaks to remove any crusting.
Dressing: bandage

## 2020-12-29 NOTE — PROCEDURE: TREATMENT REGIMEN
Detail Level: Zone
Continue Regimen: Urea 40% and apply moisturizer over
Otc Regimen: Okay to try OTC urea 30% if Rx is too expensive

## 2021-07-14 ENCOUNTER — HOSPITAL ENCOUNTER (OUTPATIENT)
Dept: PHYSICAL THERAPY | Age: 76
Discharge: HOME | End: 2021-07-14
Payer: MEDICARE

## 2021-07-14 PROCEDURE — 97161 PT EVAL LOW COMPLEX 20 MIN: CPT

## 2021-07-14 NOTE — THERAPY EVALUATION
Sheila Jackson  : 1945  [place]  Secondary: Luc[facility] Jewell [facility], [address], [place]  [phone]   [phone]          OUTPATIENT PHYSICAL THERAPY:Initial Assessment 2021   ICD-10: Treatment Diagnosis: other lymphedema I 89.0  Precautions/Allergies:   Fenofibrate,micronized; Niacin preparations; Omega-3 acid ethyl esters; Pravastatin; Rosuvastatin; Humalog [insulin lispro]; Naproxen sodium; Simvastatin; and Statins-hmg-coa reductase inhibitors    TREATMENT PLAN:  Effective Dates: 2021 TO 10/12/2021 (90 days). Frequency/Duration: 2 times a week for 90 Day(s) MEDICAL/REFERRING DIAGNOSIS:  Edema of left upper arm [R60.0]    DATE OF ONSET:  unknown  REFERRING PHYSICIAN: Loly Saab MD MD Orders: lymphedema treatment  Return MD Appointment:      INITIAL ASSESSMENT:  Ms. Jackson presents for an assessment of new onset edema of the left upper extremity. She is unsure of when it actually started. She feels it has been a couple of months or so. She reports she has had multiple tests to rule out clots or tumors ( ultrasound, CT). She has never had lymph nodes removed. She reports she has had 3 biopsies in her left breast and a supraclavicular node biopsied. Her physician feels it is idiopathic lymphedema. She has a significant girth difference between her arms. We will initiate CDT. PROBLEM LIST (Impacting functional limitations):  1. Decreased Flexibility/Joint Mobility  2. Edema/Girth  3. Decreased Knowledge of Precautions  4. Decreased Skin Integrity/Hygeine  5. Decreased [place] with Home Exercise Program INTERVENTIONS PLANNED: (Treatment may consist of any combination of the following)  1. Decongestion Therapy  2. Home Exercise Program (HEP)  3. Manual Therapy  4.  Range of Motion (ROM)     GOALS: (Goals have been discussed and agreed upon with patient.)  Short-Term Functional Goals: Time Frame: 4 weeks  1. The patient will be independent with skin care. 2. The patient will have knowledge of signs and symptoms of lymphedema and how to manage. 3. The patient will have a decrease in girth of at least 1 cm. 4. The patient will be independent with self modified MLD. Discharge Goals: Time Frame: 8 weeks  1. The patient will be fit with a compression garment. 2. The patient will acquire a compressive bra. 3. The patient will be independent with donning and doffing her garments. 4. The patient will have a reduction in girth of at least 2 cm. OUTCOME MEASURE:   Tool Used: Disabilities of the Arm, Shoulder and Hand (DASH) Questionnaire - Quick Version  Score:  Initial: 18/55  Most Recent: X/55 (Date: -- )   Interpretation of Score: The DASH is designed to measure the activities of daily living in person's with upper extremity dysfunction or pain. Each section is scored on a 1-5 scale, 5 representing the greatest disability. The scores of each section are added together for a total score of 55. Tool Used: ECOG Performance Survey Score  Score:  Initial: 0 Most Recent:      Interpretation of Score:   0 Fully active, able to carry on all pre-disease performance without restriction   1 Restricted in physically strenuous activity but ambulatory and able to carry out work of a light or sedentary nature, e.g., light house work, office work   2 Ambulatory and capable of all selfcare but unable to carry out any work activities. Up and about more than 50% of waking hours   3 Capable of only limited selfcare, confined to bed or chair more than 50% of waking hours   4 Completely disabled. Cannot carry on any selfcare. Totally confined to bed or chair   5 Dead        Tool Used: Lymphedema Life Impact Scale   Score:  Initial: 29 Most Recent: X (Date: -- )   Interpretation of Score:  The Lymphedema Life Impact Scale (LLIS) is a validated instrument that measures the physical, functional, and psychosocial concerns pertinent to patients with extremity lymphedema. The Scale's questionnaire is administered to patients to gauge impairments, activity limitations, and participation restrictions resulting from their lymphedema. MEDICAL NECESSITY:   · Patient is expected to demonstrate progress in range of motion and edema management to increase independence with household activity. REASON FOR SERVICES/OTHER COMMENTS:  · Patient continues to demonstrate capacity to improve ROM and edema management which will increase independence. Total Duration:  PT Patient Time In/Time Out  Time In: 1330  Time Out: 1449    Rehabilitation Potential For Stated Goals: Good  Regarding Sheila Blumfield Braden's therapy, I certify that the treatment plan above will be carried out by a therapist or under their direction. Thank you for this referral,  Leonardo Aparicio PT     Referring Physician Signature: Gilberto Orr MD _______________________________ Date _____________      PAIN/SUBJECTIVE:   Initial:   0 Post Session:  0/10   HISTORY:   History of Injury/Illness (Reason for Referral): Slow onset, unknown cause of lymphedema of the left upper extremity  Past Medical History/Comorbidities:   Ms. Joan Mitchell  has a past medical history of Anemia, Anxiety disorder, Asthma, Chronic kidney disease, stage 3, Claustrophobia, Colon polyps, GERD (gastroesophageal reflux disease), Gout, History of kidney stones, Hypercholesterolemia, Hypertension, Hypertriglyceridemia, and Type 2 diabetes mellitus. Ms. Joan Mitchell  has a past surgical history that includes hx destinee and bso (age 40); hx lymphadenectomy; hx breast biopsy; hx other surgical; hx cyst incision and drainage (Left); hx cataract removal (Bilateral); hx endoscopy (02/27/2018); hx colonoscopy (02/27/2018); and colonoscopy (N/A, 2/27/2018).    Past Medical History:   Diagnosis Date    Anemia     Anxiety disorder     Asthma     Chronic kidney disease, stage 3     Claustrophobia     Colon polyps     GERD (gastroesophageal reflux disease)     Gout     History of kidney stones     Hypercholesterolemia     Hypertension     Hypertriglyceridemia     Type 2 diabetes mellitus      Past Surgical History:   Procedure Laterality Date    COLONOSCOPY N/A 2/27/2018    COLONOSCOPY/ 35 [facility] ENDOSCOPY    HX BREAST BIOPSY      x4    HX CATARACT REMOVAL Bilateral     with lens implant    HX COLONOSCOPY  02/27/2018    HX CYST INCISION AND DRAINAGE Left     x 3    HX ENDOSCOPY  02/27/2018    HX LYMPHADENECTOMY      HX OTHER SURGICAL      Lymph node biopsy (cervical/supraclavicular)    HX ASHWINI AND BSO  age 40       Social History/Living Environment:     lives with spouse  Prior Level of Function/Work/Activity:  Independent, house and yard chores  Dominant Side:         RIGHT   Ambulatory/Rehab Services H2 Model Falls Risk Assessment   Risk Factors:       No Risk Factors Identified Ability to Rise from Chair:       (0)  Ability to rise in a single movement   Falls Prevention Plan:       No modifications necessary   Total: (5 or greater = High Risk): 0   ©2010 [facility] [employer]. All Rights Reserved. [facility] Patent #[id number]. Federal Law prohibits the replication, distribution or use without written permission from [facility] [employer]   Current Medications:       Current Outpatient Medications:     nebivolol (BYSTOLIC) 10 mg tablet, Take 1 Tab by mouth daily. , Disp: 90 Tab, Rfl: 3    nystatin (MYCOSTATIN) topical cream, Apply  to affected area two (2) times a day., Disp: 15 g, Rfl: 0    nystatin (MYCOSTATIN) powder, Apply  to affected area two (2) times a day., Disp: 30 g, Rfl: 11    LANTUS SOLOSTAR U-100 INSULIN 100 unit/mL (3 mL) inpn, 60 Units by SubCUTAneous route two (2) times a day., Disp: 36 Pen, Rfl: 3    NOVOLOG FLEXPEN U-100 INSULIN 100 unit/mL (3 mL) inpn, By correction 4 units per 50 >150 AC and HS (up to 80 units per day), Disp: 25 Pen, Rfl: 3    VICTOZA 3-CATRACHITO 0.6 mg/0.1 mL (18 mg/3 mL) pnij, 1.8 mg by SubCUTAneous route daily. , Disp: 9 Pen, Rfl: 3    glipiZIDE SR (GLUCOTROL XL) 5 mg CR tablet, Take 1 Tab by mouth two (2) times a day., Disp: 180 Tab, Rfl: 3    ONETOUCH ULTRA BLUE TEST STRIP strip, Check blood glucose AC/HS (4 times daily)  Dx E11.65, Disp: 400 Strip, Rfl: 3    NOVOFINE PLUS 32 gauge x 1/6\" ndle, 5 injections per day  Dx E11.65, Disp: 500 Pen Needle, Rfl: 3    OTHER, , Disp: , Rfl:     gemfibrozil (LOPID) 600 mg tablet, Take 1 Tab by mouth two (2) times a day., Disp: 180 Tab, Rfl: 3    atorvastatin (LIPITOR) 20 mg tablet, Take 1 Tab by mouth daily. , Disp: 90 Tab, Rfl: 3    losartan (COZAAR) 100 mg tablet, Take 1 Tab by mouth daily. , Disp: 90 Tab, Rfl: 3    famotidine (PEPCID) 40 mg tablet, TK 1 T PO BID, Disp: , Rfl: 5    CINNAMON BARK PO, Take 2,000 mg by mouth daily. , Disp: , Rfl:     DOCUSATE SODIUM (STOOL SOFTENER PO), Take  by mouth nightly., Disp: , Rfl:     sucralfate (CARAFATE) 1 gram tablet, Take 2 g by mouth two (2) times a day., Disp: , Rfl:     budesonide-formoterol (SYMBICORT) 160-4.5 mcg/actuation HFAA, Take 2 Puffs by inhalation daily. , Disp: , Rfl:     colchicine 0.6 mg tablet, TAKE 2 TABLETS BY MOUTH INITIALLY AT FIRST SIGN OF GOUT FLARE THEN TAKE ONE TABLET ONE HOUR LATER-last dose 12/2017, Disp: , Rfl: 3    PROAIR HFA 90 mcg/actuation inhaler, USE 2 PUFFS PRN BID OR Q 4 H, Disp: , Rfl: 11    cholecalciferol (VITAMIN D3) 400 unit tab tablet, Take 2,000 Units by mouth daily. , Disp: , Rfl:     estradiol (ESTRACE) 1 mg tablet, Take 1 mg by mouth nightly., Disp: , Rfl:     clorazepate (TRANXENE) 7.5 mg tablet, Take 7.5 mg by mouth three (3) times daily.  Pt takes 1/2 tab at HS Rarely takes daytime, Disp: , Rfl: 0    olopatadine (PATANASE) 0.6 % spry, 1 Spray by Nasal route nightly., Disp: , Rfl:     cetirizine (ZYRTEC) 10 mg tablet, as needed, Disp: , Rfl:    acetaminophen (TYLENOL ARTHRITIS PAIN) 650 mg CR tablet, Take 650 mg by mouth every six (6) hours as needed for Pain., Disp: , Rfl:    Date Last Reviewed:  7/14/21   Number of Personal Factors/Comorbidities that affect the Plan of Care: 1-2: MODERATE COMPLEXITY   EXAMINATION:   Palpation:          Tissue loose, non pitting  ROM:          right flexion 160, abduction 180, external rotation 65  Left flexion 160, abduction 130, external rotation 70  Strength:          Grossly 4/5  Functional Mobility:         Gait/Ambulation:  independent        Transfers:  independent        Bed Mobility:  independent  Skin Integrity:          intact  Edema/Girth:  non-pitting    Left Right    Initial Most Recent Initial Most Recent   Upper  Extremity Base 3rd Finger (cm): 18.5  Wrist (cm): 15.7  Mid Forearm (cm): 24.4  Elbow (cm): 28.8  Axilla (cm): 38.2 (38)   Base 3rd Finger (cm): 19.1  Wrist (cm): 15.4  Mid Forearm (cm): 22.7  Elbow (cm): 25.8  Axilla (cm): 32 (35.5)     Lower  Extremity               Body Structures Involved:  1. Joints  2. Muscles  3. lymphatic system Body Functions Affected:  1. Neuromusculoskeletal Activities and Participation Affected:  1.  None   Number of elements (examined above) that affect the Plan of Care: 4+: HIGH COMPLEXITY   CLINICAL PRESENTATION:   Presentation: Stable and uncomplicated: LOW COMPLEXITY   CLINICAL DECISION MAKING:   Use of outcome tool(s) and clinical judgement create a POC that gives a: Questionable prediction of patient's progress: MODERATE COMPLEXITY

## 2021-07-14 NOTE — PROGRESS NOTES
[facility]  [facility] 1945  [place]  Secondary: Luca Jewell at [address]  [facility] [address], [place] [address]  [phone]   [phone]        OUTPATIENT PHYSICAL THERAPY: Daily Treatment Note 2021  Visit Count:  1       ICD-10: Treatment Diagnosis: other lymphedema I 89.0  Precautions/Allergies:   Fenofibrate,micronized; Niacin preparations; Omega-3 acid ethyl esters; Pravastatin; Rosuvastatin; Humalog [insulin lispro]; Naproxen sodium; Simvastatin; and Statins-hmg-coa reductase inhibitors    TREATMENT PLAN:  Effective Dates: 2021 TO 10/12/2021 (90 days). Frequency/Duration: 2 times a week for 90 Day(s)       Pre-treatment Symptoms/Complaints:  The patient reports she noticed swelling a couple of months ago. Pain: Initial:   0/10 Post Session:  0/10   Medications Last Reviewed:  2021  Updated Objective Findings:  See evaluation note from today  TREATMENT:     MANUAL THERAPY: (15 minutes): Complex decongestive physiotherapy was utilized and necessary because of the patient's edema. the patient was educated in lymphedema, risk reduction and need for treatment. She was advised to acquire a sports bra and Eucerin lotion. We will initiate CDT on Monday. She will start with a bandage and transition to a day and night garment. We will give her a 2 week trial.  If symptoms do not improve she will be referred back to her physician. [employer] Portal  Treatment/Session Summary:    · Response to Treatment:  tolerated the assessment well. She will benefit from CDT. .  · Communication/Consultation:  she will acquire a sports bra and Eucererin lotion. · Equipment provided today:  None today  · Recommendations/Intent for next treatment session: Next visit will focus on CDT.     Total Treatment Billable Duration:  15 minutes  PT Patient Time In/Time Out  Time In: 1330  Time Out: [address] , PT    Future Appointments   Date Time Provider Cecilia Altagracia   7/19/2021  3:00 PM Martine Hairston, PT [facility]   7/22/2021  9:00 AM [facility] Coronado, [facility]   7/26/2021  9:00 AM Evelyn MCKAY, PT [facility]   7/29/2021  2:00 PM [facility]y Coronado, PT [facility]   8/2/2021 10:00 AM Evy Coronado, [facility]   8/5/2021 10:00 AM Carmelo Coronado, PT [facility]

## 2021-07-19 ENCOUNTER — HOSPITAL ENCOUNTER (OUTPATIENT)
Dept: PHYSICAL THERAPY | Age: 76
Discharge: HOME | End: 2021-07-19
Payer: MEDICARE

## 2021-07-19 PROCEDURE — 97140 MANUAL THERAPY 1/> REGIONS: CPT

## 2021-07-19 NOTE — PROGRESS NOTES
Sheila Jackson  : 1945  [place]  Secondary: Luca Jewell at [facility]  [address], [address], [place] [address]  [phone]   [phone]        OUTPATIENT PHYSICAL THERAPY: Daily Treatment Note 2021  Visit Count:  2       ICD-10: Treatment Diagnosis: other lymphedema I 89.0  Precautions/Allergies:   Fenofibrate,micronized; Niacin preparations; Omega-3 acid ethyl esters; Pravastatin; Rosuvastatin; Humalog [insulin lispro]; Naproxen sodium; Simvastatin; and Statins-hmg-coa reductase inhibitors    TREATMENT PLAN:  Effective Dates: 2021 TO 10/12/2021 (90 days). Frequency/Duration: 2 times a week for 90 Day(s)       Pre-treatment Symptoms/Complaints:  The patient reports she is dreading the bandage. Pain: Initial:   0/10 Post Session:  0/10   Medications Last Reviewed:  2021  Updated Objective Findings:  See evaluation note from today  TREATMENT:     MANUAL THERAPY: (68 minutes): Complex decongestive physiotherapy was utilized and necessary because of the patient's edema. MLD, girth assessed, skin care performedshort stretch bandage applied from fingers to axilla including stockinette, finger wraps, 6 cm, 8 cm, and 2 10 cm short stretch bandages. To remove if bandage slides or pain is present. [employer] Portal  Treatment/Session Summary:    · Response to Treatment:  tolerated the treatment well. Girth improved over last visit. .  · Communication/Consultation:  she will continue to look for a bra. · Equipment provided today:  None today  · Recommendations/Intent for next treatment session: Next visit will focus on CDT.     Total Treatment Billable Duration:  68 minutes  PT Patient Time In/Time Out  Time In: 332  Time Out: 65  Lois Samson PT    Future Appointments   Date Time Provider Cecilia Frias   2021  3:15 PM Jose F Coates PT [facility]   2021  9:00 AM Eduardo MCKAY, PT [facility]   7/29/2021  2:00 PM Evy Coronado, PT [facility]   8/2/2021 10:00 AM Maria E Coronado, PT [facility]   8/5/2021 10:00 AM [facility] E Coronado, [facility]

## 2021-07-22 ENCOUNTER — HOSPITAL ENCOUNTER (OUTPATIENT)
Dept: PHYSICAL THERAPY | Age: 76
Discharge: HOME | End: 2021-07-22
Payer: MEDICARE

## 2021-07-22 PROCEDURE — 97140 MANUAL THERAPY 1/> REGIONS: CPT

## 2021-07-22 NOTE — PROGRESS NOTES
Sheila Robles  : 1945  [place]  Secondary: Luca Jewell at [facility]  [address], [place] [address]  [phone]   [phone]        OUTPATIENT PHYSICAL THERAPY: Daily Treatment Note 2021  Visit Count:  3       ICD-10: Treatment Diagnosis: other lymphedema I 89.0  Precautions/Allergies:   Fenofibrate,micronized; Niacin preparations; Omega-3 acid ethyl esters; Pravastatin; Rosuvastatin; Humalog [insulin lispro]; Naproxen sodium; Simvastatin; and Statins-hmg-coa reductase inhibitors    TREATMENT PLAN:  Effective Dates: 2021 TO 10/12/2021 (90 days). Frequency/Duration: 2 times a week for 90 Day(s)       Pre-treatment Symptoms/Complaints:  The patient reports she has had the bandage off for 24 hours. Pain: Initial:   0/10 Post Session:  0/10   Medications Last Reviewed:  2021  Updated Objective Findings:  as below   Edema/Girth:  non-pitting    Left Right    Initial Most Recent Initial Most Recent   Upper  Extremity Base 3rd Finger (cm): (P) 18.1  Wrist (cm): (P) 15.9  Mid Forearm (cm): (P) 24  Elbow (cm): (P) 29  Axilla (cm): (P) 37.5 (38.2)         Lower  Extremity               TREATMENT:     MANUAL THERAPY: (66 minutes): Complex decongestive physiotherapy was utilized and necessary because of the patient's edema. MLD, girth assessed, skin care performed, short stretch bandage applied from fingers to axilla including stockinette, finger wraps, 6 cm, 8 cm, and 2 10 cm short stretch bandages. To remove if bandage slides or pain is present. Girth is stable. She will try to wear the bandage longer this time. She has ordered a new bra. [employer] Portal  Treatment/Session Summary:    · Response to Treatment:  tolerated the treatment well. Girth is stable. Tissue intact.     · Communication/Consultation:    · Equipment provided today:  None today  · Recommendations/Intent for next treatment session: Next visit will focus on CDT.     Total Treatment Billable Duration:  66 minutes  PT Patient Time In/Time Out  Time In: 4605  Time Out: [address], PT    Future Appointments   Date Time Provider Cecilia Frias   7/26/2021 10:00 AM Modesta Coronado, PT [facility]   7/29/2021  2:00 PM Evy Coronado, PT [facility]   8/2/2021 10:00 AM Evy Coronado, PT [facility]   8/5/2021 10:00 AM Nirali Coronado, PT [facility]

## 2021-07-26 ENCOUNTER — HOSPITAL ENCOUNTER (OUTPATIENT)
Dept: PHYSICAL THERAPY | Age: 76
Discharge: HOME | End: 2021-07-26
Payer: MEDICARE

## 2021-07-26 PROCEDURE — 97140 MANUAL THERAPY 1/> REGIONS: CPT

## 2021-07-26 NOTE — PROGRESS NOTES
Sheila Asif  : 1945  [place]  Secondary: Luca Jewell at [facility]  [address], [place] [address]  [phone]   [phone]        OUTPATIENT PHYSICAL THERAPY: Daily Treatment Note 2021  Visit Count:  4       ICD-10: Treatment Diagnosis: other lymphedema I 89.0  Precautions/Allergies:   Fenofibrate,micronized; Niacin preparations; Omega-3 acid ethyl esters; Pravastatin; Rosuvastatin; Humalog [insulin lispro]; Naproxen sodium; Simvastatin; and Statins-hmg-coa reductase inhibitors    TREATMENT PLAN:  Effective Dates: 2021 TO 10/12/2021 (90 days). Frequency/Duration: 2 times a week for 90 Day(s)       Pre-treatment Symptoms/Complaints:  The patient reports she took the bandage off yesterday afternoon. Pain: Initial:   0/10 Post Session:  0/10   Medications Last Reviewed:  2021  Updated Objective Findings:  as below   Edema/Girth:  non-pitting    Left Right    Initial Most Recent Initial Most Recent   Upper  Extremity Base 3rd Finger (cm): 18.3  Wrist (cm): 15.6  Mid Forearm (cm): 23.2  Elbow (cm): 28.8  Axilla (cm): 37.6 (38)         Lower  Extremity               TREATMENT:     MANUAL THERAPY: (62 minutes): Complex decongestive physiotherapy was utilized and necessary because of the patient's edema. MLD, girth assessed, skin care performed, short stretch bandage applied from fingers to axilla including stockinette, finger wraps, 6 cm, 8 cm, and 2 10 cm short stretch bandages. To remove if bandage slides or pain is present. Girth is decreased. She will try to wear the bandage until she returns. Her new bra is not a good fit. She will continue to look. [employer] Portal  Treatment/Session Summary:    · Response to Treatment:  tolerated the treatment well. Girth is decreased. Tissue intact.     · Communication/Consultation:    · Equipment provided today:  None today  · Recommendations/Intent for next treatment session: Next visit will focus on CDT.     Total Treatment Billable Duration:  62 minutes  PT Patient Time In/Time Out  Time In: 7259  Time Out: 2202 Jonna Arrington, PT    Future Appointments   Date Time Provider Cecilia Frias   7/29/2021  2:00 PM Jason Simmonds, [place] [facility]   8/2/2021 10:00 AM Evy Coronado PT [facility]   8/5/2021 10:00 AM Kevin Coronado, [place] [facility]

## 2021-07-29 ENCOUNTER — HOSPITAL ENCOUNTER (OUTPATIENT)
Dept: PHYSICAL THERAPY | Age: 76
Discharge: HOME | End: 2021-07-29
Payer: MEDICARE

## 2021-07-29 PROCEDURE — 97140 MANUAL THERAPY 1/> REGIONS: CPT

## 2021-07-29 NOTE — PROGRESS NOTES
[facility]  [facility] 1945  [place]  Secondary: Luca Jewell at [address]  [facility], [address], [place] [address]  [phone]   [phone]        OUTPATIENT PHYSICAL THERAPY: Daily Treatment Note 2021  Visit Count:  5       ICD-10: Treatment Diagnosis: other lymphedema I 89.0  Precautions/Allergies:   Fenofibrate,micronized; Niacin preparations; Omega-3 acid ethyl esters; Pravastatin; Rosuvastatin; Humalog [insulin lispro]; Naproxen sodium; Simvastatin; and Statins-hmg-coa reductase inhibitors    TREATMENT PLAN:  Effective Dates: 2021 TO 10/12/2021 (90 days). Frequency/Duration: 2 times a week for 90 Day(s)       Pre-treatment Symptoms/Complaints:  The patient reports she kept the bandage on even though it had slid down. Pain: Initial:   0/10 Post Session:  0/10   Medications Last Reviewed:  2021  Updated Objective Findings:  as below   Edema/Girth:  non-pitting    Left Right    Initial Most Recent Initial Most Recent   Upper  Extremity Base 3rd Finger (cm): (P) 17.8  Wrist (cm): (P) 15.5  Mid Forearm (cm): (P) 24  Elbow (cm): (P) 28.5  Axilla (cm): (P) 36.2 (37.8)         Lower  Extremity               TREATMENT:     MANUAL THERAPY: (79 minutes): Complex decongestive physiotherapy was utilized and necessary because of the patient's edema. MLD, girth assessed, skin care performed, short stretch bandage applied from fingers to axilla including stockinette, finger wraps, 6 cm, 8 cm, and 2 10 cm short stretch bandages. Added 1/4 inch foam to forearm and dorsum of the hand. To remove if bandage slides or pain is present. Girth is slightly decreased. She will try to wear the bandage until she returns. We will pursue a static night garment and elastic day garment soon if she does not respond to CDT. She will be seeing her physician next week. We will request a prescription.   [employer] Portal  Treatment/Session Summary:    · Response to Treatment:  tolerated the treatment well. Girth is slightly decreased. Tissue intact. · Communication/Consultation:    · Equipment provided today:  None today  · Recommendations/Intent for next treatment session: Next visit will focus on CDT.     Total Treatment Billable Duration:  70 minutes  PT Patient Time In/Time Out  Time In: 0800  Time Out: 0919  Noe Canchola, [place]    Future Appointments   Date Time Provider Cecilia Turneristi   8/2/2021 10:00 AM Modesta Coronado, PT [facility]   8/5/2021 10:00 AM Evy Coronado, PT [facility]   8/9/2021 10:00 AM [facility]   8/12/2021 10:00 AM Dolly Coronado, PT [facility]

## 2021-08-02 ENCOUNTER — HOSPITAL ENCOUNTER (OUTPATIENT)
Dept: PHYSICAL THERAPY | Age: 76
Discharge: HOME | End: 2021-08-02
Payer: MEDICARE

## 2021-08-02 PROCEDURE — 97140 MANUAL THERAPY 1/> REGIONS: CPT

## 2021-08-02 NOTE — PROGRESS NOTES
Sheila Jackson  : 1945  [place]  Secondary: Luca Jewell at [facility]  [address] , [address], [place] [address]  [phone]   [phone]        OUTPATIENT PHYSICAL THERAPY: Daily Treatment Note 2021  Visit Count:  6       ICD-10: Treatment Diagnosis: other lymphedema I 89.0  Precautions/Allergies:   Fenofibrate,micronized; Niacin preparations; Omega-3 acid ethyl esters; Pravastatin; Rosuvastatin; Humalog [insulin lispro]; Naproxen sodium; Simvastatin; and Statins-hmg-coa reductase inhibitors    TREATMENT PLAN:  Effective Dates: 2021 TO 10/12/2021 (90 days). Frequency/Duration: 2 times a week for 90 Day(s)       Pre-treatment Symptoms/Complaints:  The patient reports she took the bandage off yesterday. Pain: Initial:   0/10 Post Session:  0/10   Medications Last Reviewed:  2021  Updated Objective Findings:  as below   Edema/Girth:  non-pitting    Left Right    Initial Most Recent Initial Most Recent   Upper  Extremity Base 3rd Finger (cm): 18.4  Wrist (cm): 15.8  Mid Forearm (cm): 24.3  Elbow (cm): 28.9  Axilla (cm): 37 (37.4)         Lower  Extremity               TREATMENT:     MANUAL THERAPY: (53 minutes): Complex decongestive physiotherapy was utilized and necessary because of the patient's edema. MLD, girth assessed. Girth is slightly increased. She has a heat rash on her forearm. We will hold bandaging for a week. If she remains stable we will postpone the garments. If she increases girth without compression we will pursue the garments. We will pursue a static night garment and elastic day garment. She will be seeing her physician tomorrow. We will request a prescription. She will be going out of town at the end of August for a month. We will continue MLD even if we do not include compression.   [employer]  Treatment/Session Summary:    · Response to Treatment: tolerated the treatment well. Girth is slightly increased. She has a mild heat rash. We will hold bandaging this week. · Communication/Consultation:    · Equipment provided today:  None today  · Recommendations/Intent for next treatment session: Next visit will focus on CDT.     Total Treatment Billable Duration:  53 minutes  PT Patient Time In/Time Out  Time In: 3187  Time Out: 1310 Shaye Rios, PT    Future Appointments   Date Time Provider Cecilia Altagracia   8/5/2021 10:00 AM Modesta Coronado, PT [facility]   8/9/2021 10:00 AM Evy Coronado, PT [facility]   8/12/2021 10:00 AM [facility] Coronado[facility]

## 2021-08-05 ENCOUNTER — HOSPITAL ENCOUNTER (OUTPATIENT)
Dept: PHYSICAL THERAPY | Age: 76
Discharge: HOME | End: 2021-08-05
Payer: MEDICARE

## 2021-08-05 PROCEDURE — 97140 MANUAL THERAPY 1/> REGIONS: CPT

## 2021-08-09 ENCOUNTER — HOSPITAL ENCOUNTER (OUTPATIENT)
Dept: PHYSICAL THERAPY | Age: 76
Discharge: HOME | End: 2021-08-09
Payer: MEDICARE

## 2021-08-09 PROCEDURE — 97140 MANUAL THERAPY 1/> REGIONS: CPT

## 2021-08-09 NOTE — PROGRESS NOTES
Sheila Jackson  : 1945  [place]  Secondary: Luca Jewell at [facility]  [address], [address], [place] [address]  [phone]   [phone]        OUTPATIENT PHYSICAL THERAPY: Daily Treatment Note 2021  Visit Count:  8       ICD-10: Treatment Diagnosis: other lymphedema I 89.0  Precautions/Allergies:   Fenofibrate,micronized; Niacin preparations; Omega-3 acid ethyl esters; Pravastatin; Rosuvastatin; Humalog [insulin lispro]; Naproxen sodium; Simvastatin; and Statins-hmg-coa reductase inhibitors    TREATMENT PLAN:  Effective Dates: 2021 TO 10/12/2021 (90 days). Frequency/Duration: 2 times a week for 90 Day(s)       Pre-treatment Symptoms/Complaints:  The patient reports she is doing well. Pain: Initial:   0/10 Post Session:  0/10   Medications Last Reviewed:  2021  Updated Objective Findings:  as below   Edema/Girth:  non-pitting    Left Right    Initial Most Recent Initial Most Recent   Upper  Extremity Base 3rd Finger (cm): 18.5  Wrist (cm): 15.6  Mid Forearm (cm): 23.7  Elbow (cm): 28.5  Axilla (cm): 37 (38.8)         Lower  Extremity               TREATMENT:     MANUAL THERAPY: (59 minutes): Complex decongestive physiotherapy was utilized and necessary because of the patient's edema. MLD for the left upper extremity. Girth assessed and is stable. No compression at this time. She had CDT for 2 weeks and then MLD for a week and no compression. Girth remained stable. She does not appear to have lymphedema at this time. We will see her back after her vacation and decide if she needs compression. If measurements are stable we will not recommend compression. [employer] [facility]  Treatment/Session Summary:    · Response to Treatment:  tolerated the treatment well. Girth is stable. She will take a break.   When she returns from vacation we will assess girth and make a decision if she needs compression. At this time she does not appear to have lymphedema. · Communication/Consultation:    · Equipment provided today:  None today  · Recommendations/Intent for next treatment session: Next visit assess girth and decide if garments are indicated.     Total Treatment Billable Duration:  59 minutes  PT Patient Time In/Time Out  Time In: 1000  Time Out: [address], PT    Future Appointments   Date Time Provider Cecilia Altagracia   10/11/2021 10:00 AM Ronak Coronado, PT [facility]

## 2021-08-12 ENCOUNTER — APPOINTMENT (OUTPATIENT)
Dept: PHYSICAL THERAPY | Age: 76
End: 2021-08-12
Payer: MEDICARE

## 2021-10-11 ENCOUNTER — HOSPITAL ENCOUNTER (OUTPATIENT)
Dept: PHYSICAL THERAPY | Age: 76
Discharge: HOME | End: 2021-10-11
Payer: MEDICARE

## 2021-10-11 PROCEDURE — 97140 MANUAL THERAPY 1/> REGIONS: CPT

## 2021-10-11 NOTE — PROGRESS NOTES
Sheila Way  : 1945  [place]  Secondary: [facility] [address], [place] [address]  [phone]   [phone]        OUTPATIENT PHYSICAL THERAPY: Daily Treatment Note 10/11/2021  Visit Count:  9       ICD-10: Treatment Diagnosis: other lymphedema I 89.0  Precautions/Allergies:   Fenofibrate,micronized; Niacin preparations; Omega-3 acid ethyl esters; Pravastatin; Rosuvastatin; Humalog [insulin lispro]; Naproxen sodium; Simvastatin; and Statins-hmg-coa reductase inhibitors    TREATMENT PLAN:  Effective Dates: 2021 TO 10/12/2021 (90 days). Frequency/Duration: 2 times a week for 90 Day(s)       Pre-treatment Symptoms/Complaints:  The patient reports she has been dealing with an injury of her ankle which turned into gout. She reports she did not notice a change in her arm on her vacation. Pain: Initial:   0/10 Post Session:  0/10   Medications Last Reviewed:  10/11/2021  Updated Objective Findings:  as below   Edema/Girth:  non-pitting    Left Right    Initial Most Recent Initial Most Recent   Upper  Extremity Base 3rd Finger (cm): 18.3  Wrist (cm): 15.5  Mid Forearm (cm): 23.4  Elbow (cm): 28.4  Axilla (cm): 36.4 (37.1)         Lower  Extremity               TREATMENT:     MANUAL THERAPY: (20 minutes): Complex decongestive physiotherapy was utilized and necessary because of the patient's edema. the patient returns after having been away from therapy for 2 months. She did not compress her arm during this time. Rodríguez Raymond is assessed today and is stable. The measurements are slightly improved. She will continue to monitor at this time. There is no need for further intervention. She will take precautions such as no needle sticks or BP checks. She will return if she notices a change in edema.   She is planning to return to an exercise program once her gout has improved. [employer] [facility]  Treatment/Session Summary:    · Response to Treatment:  tolerated the treatment well. Girth is stable. No further intervention at this time. · Communication/Consultation:    · Equipment provided today:  None today  · Recommendations/Intent for next treatment session: No further visits scheduled. Total Treatment Billable Duration:  20 minutes  PT Patient Time In/Time Out  Time In: 8070  Time Out: [address] [place] [address], PT    No future appointments.

## 2021-10-11 NOTE — THERAPY DISCHARGE
Sheila Jackson  : 1945  [place]  [facility]: [facility] [address], [place] [address]  [phone]   [phone]          OUTPATIENT PHYSICAL THERAPY:Discharge Summary 10/11/2021   ICD-10: Treatment Diagnosis: other lymphedema I 89.0  Precautions/Allergies:   Fenofibrate,micronized; Niacin preparations; Omega-3 acid ethyl esters; Pravastatin; Rosuvastatin; Humalog [insulin lispro]; Naproxen sodium; Simvastatin; and Statins-hmg-coa reductase inhibitors    TREATMENT PLAN:  Effective Dates: 2021 TO 10/12/2021 (90 days). Frequency/Duration:  No further visits scheduled MEDICAL/REFERRING DIAGNOSIS:  Edema of left upper arm [R60.0]    DATE OF ONSET:  unknown  REFERRING PHYSICIAN: Bobby Blizzard, MD MD Orders: lymphedema treatment  Return MD Appointment:      INITIAL ASSESSMENT:  Ms. Jackson presents for an assessment of new onset edema of the left upper extremity. She is unsure of when it actually started. She feels it has been a couple of months or so. She reports she has had multiple tests to rule out clots or tumors ( ultrasound, CT). She has never had lymph nodes removed. She reports she has had 3 biopsies in her left breast and a supraclavicular node biopsied. Her physician feels it is idiopathic lymphedema. She has a significant girth difference between her arms. We will initiate CDT. 10/11/21:  The patient attended a total of 9 visits. She received education in lymphedema along with complete decongestive therapy. The patient's girth measurements remained relatively stable despite interventions. She returned this week having been out of town for an extended period. She did not wear compression during her absence. Girth was assessed today and remains stable. The measurements are slightly decreased, but not significantly.   The patient's arm does not respond as if she has lymphedema. The differences in her girth measurements between arms may be due to a difference in fat deposition. At this point she will continue to monitor the arm. If she notices a change in girth she will return. PROBLEM LIST (Impacting functional limitations):  1. Decreased Flexibility/Joint Mobility  2. Edema/Girth  3. Decreased Knowledge of Precautions  4. Decreased Skin Integrity/Hygeine  5. Decreased Willacy with Home Exercise Program INTERVENTIONS PLANNED: (Treatment may consist of any combination of the following)  1. Decongestion Therapy  2. Home Exercise Program (HEP)  3. Manual Therapy  4. Range of Motion (ROM)     GOALS: (Goals have been discussed and agreed upon with patient.)  Short-Term Functional Goals: Time Frame: 4 weeks  1. The patient will be independent with skin care. Met   2. The patient will have knowledge of signs and symptoms of lymphedema and how to manage. Met   3. The patient will have a decrease in girth of at least 1 cm. Met   4. The patient will be independent with self modified MLD. Met   Discharge Goals: Time Frame: 8 weeks  1. The patient will be fit with a compression garment. 2. The patient will acquire a compressive bra. Met   3. The patient will be independent with donning and doffing her garments. Ongoing   4. The patient will have a reduction in girth of at least 2 cm. Ongoing     OUTCOME MEASURE:   Tool Used: Disabilities of the Arm, Shoulder and Hand (DASH) Questionnaire - Quick Version  Score:  Initial: 18/55  Most Recent: X/55 (Date: -- )   Interpretation of Score: The DASH is designed to measure the activities of daily living in person's with upper extremity dysfunction or pain. Each section is scored on a 1-5 scale, 5 representing the greatest disability. The scores of each section are added together for a total score of 55.         Tool Used: ECOG Performance Survey Score  Score:  Initial: 0 Most Recent:  0    Interpretation of Score: 0 Fully active, able to carry on all pre-disease performance without restriction   1 Restricted in physically strenuous activity but ambulatory and able to carry out work of a light or sedentary nature, e.g., light house work, office work   2 Ambulatory and capable of all selfcare but unable to carry out any work activities. Up and about more than 50% of waking hours   3 Capable of only limited selfcare, confined to bed or chair more than 50% of waking hours   4 Completely disabled. Cannot carry on any selfcare. Totally confined to bed or chair   5 Dead        Tool Used: Lymphedema Life Impact Scale   Score:  Initial: 29 Most Recent: X (Date: -- )   Interpretation of Score: The Lymphedema Life Impact Scale (LLIS) is a validated instrument that measures the physical, functional, and psychosocial concerns pertinent to patients with extremity lymphedema. The Scale's questionnaire is administered to patients to gauge impairments, activity limitations, and participation restrictions resulting from their lymphedema. Total Duration:  PT Patient Time In/Time Out  Time In: 0950  Time Out: 1010    Rehabilitation Potential For Stated Goals: Good  Regarding Sheila Braden's therapy, I certify that the treatment plan above will be carried out by a therapist or under their direction.   Thank you for this referral,  Mayela Cha, PT

## 2021-12-14 ENCOUNTER — APPOINTMENT (RX ONLY)
Dept: URBAN - METROPOLITAN AREA CLINIC 24 | Facility: CLINIC | Age: 76
Setting detail: DERMATOLOGY
End: 2021-12-14

## 2021-12-14 DIAGNOSIS — Z79.01 LONG TERM (CURRENT) USE OF ANTICOAGULANTS: ICD-10-CM

## 2021-12-14 DIAGNOSIS — L82.1 OTHER SEBORRHEIC KERATOSIS: ICD-10-CM

## 2021-12-14 DIAGNOSIS — L81.4 OTHER MELANIN HYPERPIGMENTATION: ICD-10-CM

## 2021-12-14 PROBLEM — D23.39 OTHER BENIGN NEOPLASM OF SKIN OF OTHER PARTS OF FACE: Status: ACTIVE | Noted: 2021-12-14

## 2021-12-14 PROCEDURE — ? COUNSELING

## 2021-12-14 PROCEDURE — 99213 OFFICE O/P EST LOW 20 MIN: CPT

## 2021-12-14 ASSESSMENT — LOCATION SIMPLE DESCRIPTION DERM
LOCATION SIMPLE: RIGHT THIGH
LOCATION SIMPLE: RIGHT UPPER BACK
LOCATION SIMPLE: LEFT FOREARM
LOCATION SIMPLE: LEFT CHEEK
LOCATION SIMPLE: LEFT THIGH
LOCATION SIMPLE: LEFT SHOULDER
LOCATION SIMPLE: RIGHT BREAST

## 2021-12-14 ASSESSMENT — LOCATION ZONE DERM
LOCATION ZONE: LEG
LOCATION ZONE: FACE
LOCATION ZONE: ARM
LOCATION ZONE: TRUNK

## 2021-12-14 ASSESSMENT — LOCATION DETAILED DESCRIPTION DERM
LOCATION DETAILED: LEFT DISTAL DORSAL FOREARM
LOCATION DETAILED: RIGHT LATERAL BREAST 11-12:00 REGION
LOCATION DETAILED: LEFT ANTERIOR DISTAL THIGH
LOCATION DETAILED: RIGHT ANTERIOR DISTAL THIGH
LOCATION DETAILED: LEFT ANTERIOR SHOULDER
LOCATION DETAILED: LEFT INFERIOR CENTRAL MALAR CHEEK
LOCATION DETAILED: LEFT POSTERIOR SHOULDER
LOCATION DETAILED: RIGHT SUPERIOR UPPER BACK

## 2022-03-19 PROBLEM — L30.4 INTERTRIGO: Status: ACTIVE | Noted: 2019-07-22

## 2022-03-19 PROBLEM — D50.9 IRON DEFICIENCY ANEMIA: Status: ACTIVE | Noted: 2018-03-12

## 2022-05-25 ENCOUNTER — HOSPITAL ENCOUNTER (OUTPATIENT)
Dept: PHYSICAL THERAPY | Age: 77
Setting detail: RECURRING SERIES
Discharge: HOME | End: 2022-05-28
Payer: MEDICARE

## 2022-05-25 PROCEDURE — 97110 THERAPEUTIC EXERCISES: CPT

## 2022-05-25 PROCEDURE — 97161 PT EVAL LOW COMPLEX 20 MIN: CPT

## 2022-05-25 NOTE — PLAN OF CARE
Trixie Guerra  : 1945  Primary: Medicare Part [place]  Secondary: [place] [address] [place] [address]  Phone: [phone]  Fax: [phone] Plan Frequency: 1x/week for 12 weeks    Plan of Care/Certification Expiration Date: 22      PT Visit Info: Total # of Visits to Date: 1      OUTPATIENT PHYSICAL THERAPY:OP NOTE TYPE: Initial Assessment 2022               Episode  Appt Desk         Treatment Diagnosis:  Lymphedema, not elsewhere classified (I89.0)  * No diagnoses found *  Medical/Referring Diagnosis:  Localized edema [R60.0]  Lymphedema, not elsewhere classified [I89.0]  Referring Physician:  Rani Flores MD MD Orders:  PT Eval and Treat lymphedema assessment  Return MD Appt:     Date of Onset:  Onset Date: 21     Allergies:  Docosahexaenoic acid (dha), Fenofibrate, Pravastatin, Rosuvastatin, Naproxen sodium, Simvastatin, and Insulin lispro  Restrictions/Precautions:    Restrictions/Precautions: None  No data recorded   Medications Last Reviewed:  2022     SUBJECTIVE   History of Injury/Illness (Reason for Referral): The patient has had an increased girth of her left upper extremity. She would like to have it assessed to rule out lymphedema. Patient Stated Goal(s):  \"the patient would like to exercises without causing an increase in girth\"  Initial:      /10 Post Session:      /10  Past Medical History/Comorbidities:   Ms. Som Larry  has a past medical history of Anemia, Anxiety disorder, Asthma, Chronic kidney disease, stage 3 ([address] Claustrophobia, Colon polyps, GERD (gastroesophageal reflux disease), Gout, History of kidney stones, Hypercholesterolemia, Hypertension, Hypertriglyceridemia, and Uncontrolled type 2 diabetes mellitus ([address] Ms. Som Larry  has a past surgical history that includes Upper gastrointestinal endoscopy (2018);  Cataract removal (Bilateral); cyst incision and drainage (Left); other surgical history; Breast biopsy; lymphadenectomy; Colonoscopy (N/A, 2/27/2018); Colonoscopy (02/27/2018); and jd and bso (cervix removed) (age 40). Past Medical History:   Diagnosis Date    Anemia     Anxiety disorder     Asthma     Chronic kidney disease, stage 3 (HCC)     Claustrophobia     Colon polyps     GERD (gastroesophageal reflux disease)     Gout     History of kidney stones     Hypercholesterolemia     Hypertension     Hypertriglyceridemia     Uncontrolled type 2 diabetes mellitus ([address]      Past Surgical History:   Procedure Laterality Date    BREAST BIOPSY      x4    CATARACT REMOVAL Bilateral     with lens implant    COLONOSCOPY N/A 2/27/2018    COLONOSCOPY/ 35 [facility] ENDOSCOPY    COLONOSCOPY  02/27/2018    CYST INCISION AND DRAINAGE Left     x 3    LYMPHADENECTOMY      OTHER SURGICAL HISTORY      Lymph node biopsy (cervical/supraclavicular)    JD AND BSO  age 40   [place] UPPER GASTROINTESTINAL ENDOSCOPY  02/27/2018       Social History/Living Environment:   Lives With: Spouse     Prior Level of Function/Work/Activity:   Prior level of function: independent  Prior level of function: Independent  Current level of function: independent  Occupation: Retired  No data recordedNo data recorded   Learning:   Does the patient/guardian have any barriers to learning?: No barriers  Will there be a co-learner?: No  What is the preferred language of the patient/guardian?: English  Is an  required?: No  How does the patient/guardian prefer to learn new concepts?: Listening; Reading; Demonstration; Pictures/Videos     Fall Risk Scale:    Total Score: 0  Richey Fall Risk: Low (0-24)     Dominant Side:  right handed        OBJECTIVE   Lymphedema:  Girth Measurements:  Initial Measurements:  Current Measurements:   Right Upper Extremity:  Base 3rd Finger (cm): 19.1  Wrist (cm): 15.4  Mid Forearm (cm): 22.7  Elbow (cm): 25.8 (10 cm above 32)  Axilla (cm): 35.5  Total Girth (cm): 118.5  Right Upper Extremity:      Left Upper Extremity:  Base 3rd Finger (cm): 18.1  Wrist (cm): 15.7  Mid Forearm (cm): 23.7  Elbow (cm): 28.3 (10 cm above 36.5)  Axilla (cm): 36.5  Total Girth (cm): 122.3  Left Upper Extremity:      Right Lower Extremity:     Right Lower Extremity:      Left Lower Extremity:     Left Lower Extremity:      Trunk:     Trunk:          ASSESSMENT   Initial Assessment:  The patient presents for a lymphedema assessment of the left upper extremity. She has noticed she has fullness in this region. She has previously been assessed for lymphedema in the past (discharged 10/21). She would like to be assessed again. She has increased girth measurements when compared to the right upper extremity. Girth is stable when compared to the left upper extremity from 10/21. Tissue is fleshy and does not feel edematous. She appears to have increased adipose tissue as opposed to edema in this arm. She is concerned with her loss of muscle tone. She will benefit from a strengthening program.  She was agiven a written program.  She will initiate this and return for a girth check. Problem List: (Impacting functional limitations): Body Structures, Functions, Activity Limitations Requiring Skilled Therapeutic Intervention: Decreased functional mobility ; Decreased strength     Therapy Prognosis:   Therapy Prognosis: Good     Assessment Complexity:   Low Complexity  PLAN   Effective Dates: 5/25/22 TO Plan of Care/Certification Expiration Date: 08/23/22     Frequency/Duration: Plan Frequency: 1x/week for 12 weeks     Interventions Planned (Treatment may consist of any combination of the following):    Current Treatment Recommendations: Manual lymphatic drainage; Home exercise program; Strengthening     Goals: (Goals have been discussed and agreed upon with patient.)  Short-Term Functional Goals: Time Frame: 4 weeks  1.  The patient will have knowledge of signs and symptoms of lymphedema and how to manage within 4 weeks. 2. The patient will be independent with HEP for strength within 4 weeks. Discharge Goals: Time Frame: 8 weeks   1. The patient will gain 1/2 grade strength of bilateral upper extremities within 8 weeks. 2. The patient will be fit with a compression garment as need for exercises dependent on her tolerance to exercise within 8 weeks. 3. Girth will remain stable when participating in an exercise program within 8 weeks. Outcome Measure: Tool Used: Disabilities of the Arm, Shoulder and Hand (DASH) Questionnaire - Quick Version  Score:  Initial: 12/55  Most Recent: X/55 (Date: -- )   Interpretation of Score: The DASH is designed to measure the activities of daily living in person's with upper extremity dysfunction or pain. Each section is scored on a 1-5 scale, 5 representing the greatest disability. The scores of each section are added together for a total score of 55. Tool Used: ECOG Performance Survey Score  Score:  Initial: 0 Most Recent:    Interpretation of Score:   0 Fully active, able to carry on all pre-disease performance without restriction   1 Restricted in physically strenuous activity but ambulatory and able to carry out work of a light or sedentary nature, e.g., light house work, office work   2 Ambulatory and capable of all selfcare but unable to carry out any work activities. Up and about more than 50% of waking hours   3 Capable of only limited selfcare, confined to bed or chair more than 50% of waking hours   4 Completely disabled. Cannot carry on any selfcare. Totally confined to bed or chair   5 Dead       Tool Used: Lymphedema Life Impact Scale   Score:  Initial: 7 Most Recent: X (Date: -- )   Interpretation of Score: The Lymphedema Life Impact Scale (LLIS) is a validated instrument that measures the physical, functional, and psychosocial concerns pertinent to patients with extremity lymphedema.   The Scale's questionnaire is administered to patients to gauge impairments, activity limitations, and participation restrictions resulting from their lymphedema. Medical Necessity:   Patient is expected to demonstrate progress in strength and edema management to reduce risk of cellulitis. Reason For Services/Other Comments:  Patient continues to demonstrate capacity to improve strength and edema management which will reduce risk of cellulitis. Total Duration:  Time In: 8631  Time Out: 1008    Regarding Shital Tejada's therapy, I certify that the treatment plan above will be carried out by a therapist or under their direction.   Thank you for this referral,  Isaias George PT     Referring Physician Signature: Nimco Salmon MD _______________________________ Date _____________        Post Session Pain  Charge Capture   POC Link  Treatment Note Link  MD Guidelines  [facility]

## 2022-05-25 NOTE — PROGRESS NOTES
Jimmy Eliana  : 1945  Primary: Medicare Part A And B  Secondary: [facility]  [address]    [address] [facility] [place] [address]  Phone: [phone]  Fax: [phone] Plan Frequency: 1x/week for 12 weeks    Plan of Care/Certification Expiration Date: 22      PT Visit Info: Total # of Visits to Date: 1      OUTPATIENT PHYSICAL THERAPY:OP NOTE TYPE: Treatment Note 2022       Episode   Appt Desk       Treatment Diagnosis:  Lymphedema, not elsewhere classified (I89.0)  Medical/Referring Diagnosis:  Localized edema [R60.0]  Lymphedema, not elsewhere classified [I89.0]  Referring Physician:  Zeferino Barahona MD MD Orders:  PT Eval and Treat lymphedema  Date of Onset:  No data recorded   Allergies:  Docosahexaenoic acid (dha), Fenofibrate, Pravastatin, Rosuvastatin, Naproxen sodium, Simvastatin, and Insulin lispro  Restrictions/Precautions:    Restrictions/Precautions: None  No data recorded   Interventions Planned (Treatment may consist of any combination of the following):    Current Treatment Recommendations: Manual lymphatic drainage; Home exercise program; Strengthening     Subjective Comments:  the patient reports she would like to keep a check on her arm and make sure it isn't increasing in size  Initial:      /10 Post Session:      /10  Medications Last Reviewed:  2022  Updated Objective Findings:  See evaluation note from today  Treatment   THERAPEUTIC EXERCISE: (10 minutes):    Exercises per grid below to improve strength. Required minimal verbal cues to use correct resistance due to possible risk for lymphedema. Progressed resistance as indicated. Educated in the below exercises. She will initiate the exercises and return for a girth recheck. She will return sooner if edema is present. MANUAL THERAPY: (  minutes): Complex decongestive physiotherapy was utilized and necessary because of the patient's risk for edema.      Manual Lymph Drainage: not performed  Lymph Nodes:    Cervical Supraclavicular Axillary Abdominal Inguinal Popliteal Antecubital   RIGHT []     []     []     []     []     []     []       LEFT []     []     []     []     []     []     []         Anastamoses:   Axillo-axillary Inguino-inguinal Axillo-inguinal Inguino-axillary   ANTERIOR []     []     []     []       POSTERIOR []     []     []     []       RIGHT []     []     []     []       LEFT []     []     []     []         Limbs:   []    RUE     [x]    LUE     []    RLE    []    LLE    Access Code: [email or web address]  URL: [email or web address]  Date: 05/25/2022  Prepared by: Floyd Hurd    Exercises  Seated Bicep Curls Supinated with Dumbbells - 1 x daily - 3 x weekly - 1 sets - 10 reps  Seated Shoulder Flexion with Dumbbells - 1 x daily - 3 x weekly - 1 sets - 10 reps  Seated Shoulder Abduction with Dumbbells - Thumbs Up - 1 x daily - 3 x weekly - 1 sets - 10 reps  Standing Bent Over Single Arm Scapular Row with Table Support - 1 x daily - 3 x weekly - 1 sets - 10 reps  Bent Over Tricep Extension with Counter Support - 1 x daily - 3 x weekly - 1 sets - 10 reps    Treatment/Session Summary:    · Treatment Assessment: the patient has an increase in the size of her left upper extremity in comparison to her right. The tissue is very soft and does not appear to have fluid. There may be a difference in adipose tissue deposition rather than fluid. · Communication/Consultation:  Spoke with Mrs. Mahajan Early in regards to beginning a strengtheing program for her upper extremities. .   She will initiate a strengthening program and return for an assessment of girth. · Equipment provided today:  None  · Recommendations/Intent for next treatment session: Next visit will focus on assessing girth and possible signs of lymphedema.     Total Treatment Billable Duration:  10 minutes therex, evaluation 63 minutes  Time In: 7022  Time Out: [address] [facility] [place] Session Pain  Charge Capture  [employer] Portal  MD Guidelines  Scanned Media  Benefits  [employer]

## 2022-05-28 ENCOUNTER — HOSPITAL ENCOUNTER (EMERGENCY)
Age: 77
Discharge: HOME | End: 2022-05-28
Attending: EMERGENCY MEDICINE
Payer: MEDICARE

## 2022-05-28 VITALS
WEIGHT: 174 LBS | TEMPERATURE: 98.8 F | RESPIRATION RATE: 15 BRPM | DIASTOLIC BLOOD PRESSURE: 123 MMHG | HEART RATE: 104 BPM | OXYGEN SATURATION: 94 % | BODY MASS INDEX: 35.08 KG/M2 | HEIGHT: 59 IN | SYSTOLIC BLOOD PRESSURE: 143 MMHG

## 2022-05-28 DIAGNOSIS — N30.00 ACUTE CYSTITIS WITHOUT HEMATURIA: Primary | ICD-10-CM

## 2022-05-28 DIAGNOSIS — E86.0 DEHYDRATION: ICD-10-CM

## 2022-05-28 LAB
ALBUMIN SERPL-MCNC: 3.8 G/DL (ref 3.2–4.6)
ALBUMIN/GLOB SERPL: 1.3 {RATIO}
ALP SERPL-CCNC: 62 U/L (ref 45–117)
ALT SERPL-CCNC: 21 U/L (ref 13–61)
ANION GAP SERPL CALC-SCNC: 18 MMOL/L (ref 7–16)
APPEARANCE UR: ABNORMAL
AST SERPL-CCNC: 28 U/L (ref 15–37)
BACTERIA URNS QL MICRO: ABNORMAL /HPF
BASOPHILS # BLD: 0 K/UL (ref 0–0.2)
BASOPHILS NFR BLD: 0 % (ref 0–2)
BILIRUB SERPL-MCNC: 0.4 MG/DL (ref 0.2–1.1)
BILIRUB UR QL: ABNORMAL
BUN SERPL-MCNC: 34 MG/DL (ref 7–18)
CALCIUM SERPL-MCNC: 9.6 MG/DL (ref 8.3–10.4)
CASTS URNS QL MICRO: ABNORMAL /LPF
CHLORIDE SERPL-SCNC: 99 MMOL/L (ref 98–107)
CO2 SERPL-SCNC: 18 MMOL/L (ref 21–32)
COLOR UR: ABNORMAL
CREAT SERPL-MCNC: 2.08 MG/DL (ref 0.6–1)
CRYSTALS URNS QL MICRO: 0 /LPF
DIFFERENTIAL METHOD BLD: ABNORMAL
EOSINOPHIL # BLD: 0.1 K/UL (ref 0–0.8)
EOSINOPHIL NFR BLD: 0 % (ref 0.5–7.8)
EPI CELLS #/AREA URNS HPF: ABNORMAL /HPF
ERYTHROCYTE [DISTWIDTH] IN BLOOD BY AUTOMATED COUNT: 12.5 % (ref 11.9–14.6)
GLOBULIN SER CALC-MCNC: 3 G/DL (ref 2.3–3.5)
GLUCOSE SERPL-MCNC: 251 MG/DL (ref 65–100)
GLUCOSE UR STRIP.AUTO-MCNC: NEGATIVE MG/DL
HCT VFR BLD AUTO: 37.5 % (ref 35.8–46.3)
HGB BLD-MCNC: 12.2 G/DL (ref 11.7–15.4)
HGB UR QL STRIP: ABNORMAL
IMM GRANULOCYTES # BLD AUTO: 0.1 K/UL (ref 0–0.5)
IMM GRANULOCYTES NFR BLD AUTO: 1 % (ref 0–5)
KETONES UR QL STRIP.AUTO: ABNORMAL MG/DL
LEUKOCYTE ESTERASE UR QL STRIP.AUTO: ABNORMAL
LYMPHOCYTES # BLD: 0.1 K/UL (ref 0.5–4.6)
LYMPHOCYTES NFR BLD: 1 % (ref 13–44)
MCH RBC QN AUTO: 32.1 PG (ref 26.1–32.9)
MCHC RBC AUTO-ENTMCNC: 32.5 G/DL (ref 31.4–35)
MCV RBC AUTO: 98.7 FL (ref 79.6–97.8)
MONOCYTES # BLD: 0.5 K/UL (ref 0.1–1.3)
MONOCYTES NFR BLD: 4 % (ref 4–12)
MUCOUS THREADS URNS QL MICRO: 0 /LPF
NEUTS SEG # BLD: 10.6 K/UL (ref 1.7–8.2)
NEUTS SEG NFR BLD: 93 % (ref 43–78)
NITRITE UR QL STRIP.AUTO: NEGATIVE
NRBC # BLD: 0 K/UL (ref 0–0.2)
PH UR STRIP: 5 [PH] (ref 5–9)
PLATELET # BLD AUTO: 134 K/UL (ref 150–450)
PMV BLD AUTO: 11.1 FL (ref 9.4–12.3)
POTASSIUM SERPL-SCNC: 4.6 MMOL/L (ref 3.5–5.1)
PROT SERPL-MCNC: 6.8 G/DL (ref 6.4–8.2)
PROT UR STRIP-MCNC: 100 MG/DL
RBC # BLD AUTO: 3.8 M/UL (ref 4.05–5.2)
RBC #/AREA URNS HPF: ABNORMAL /HPF
SODIUM SERPL-SCNC: 135 MMOL/L (ref 136–145)
SP GR UR REFRACTOMETRY: 1.02 (ref 1–1.02)
UROBILINOGEN UR QL STRIP.AUTO: 0.2 EU/DL (ref 0.2–1)
WBC # BLD AUTO: 11.3 K/UL (ref 4.3–11.1)
WBC URNS QL MICRO: ABNORMAL /HPF

## 2022-05-28 PROCEDURE — 87086 URINE CULTURE/COLONY COUNT: CPT

## 2022-05-28 PROCEDURE — 6360000002 HC RX W HCPCS: Performed by: EMERGENCY MEDICINE

## 2022-05-28 PROCEDURE — 96361 HYDRATE IV INFUSION ADD-ON: CPT

## 2022-05-28 PROCEDURE — 81015 MICROSCOPIC EXAM OF URINE: CPT

## 2022-05-28 PROCEDURE — 2580000003 HC RX 258: Performed by: EMERGENCY MEDICINE

## 2022-05-28 PROCEDURE — 96376 TX/PRO/DX INJ SAME DRUG ADON: CPT

## 2022-05-28 PROCEDURE — 85025 COMPLETE CBC W/AUTO DIFF WBC: CPT

## 2022-05-28 PROCEDURE — 80053 COMPREHEN METABOLIC PANEL: CPT

## 2022-05-28 PROCEDURE — 96375 TX/PRO/DX INJ NEW DRUG ADDON: CPT

## 2022-05-28 PROCEDURE — 81001 URINALYSIS AUTO W/SCOPE: CPT

## 2022-05-28 PROCEDURE — 99284 EMERGENCY DEPT VISIT MOD MDM: CPT

## 2022-05-28 PROCEDURE — 96365 THER/PROPH/DIAG IV INF INIT: CPT

## 2022-05-28 RX ORDER — ONDANSETRON 2 MG/ML
4 INJECTION INTRAMUSCULAR; INTRAVENOUS
Status: COMPLETED | OUTPATIENT
Start: 2022-05-28 | End: 2022-05-28

## 2022-05-28 RX ORDER — MORPHINE SULFATE 4 MG/ML
4 INJECTION INTRAVENOUS ONCE
Status: COMPLETED | OUTPATIENT
Start: 2022-05-28 | End: 2022-05-28

## 2022-05-28 RX ORDER — HYDROCODONE BITARTRATE AND ACETAMINOPHEN 5; 325 MG/1; MG/1
1 TABLET ORAL EVERY 6 HOURS PRN
Qty: 17 TABLET | Refills: 0 | Status: SHIPPED | OUTPATIENT
Start: 2022-05-28 | End: 2022-06-01

## 2022-05-28 RX ORDER — SODIUM CHLORIDE, SODIUM LACTATE, POTASSIUM CHLORIDE, AND CALCIUM CHLORIDE .6; .31; .03; .02 G/100ML; G/100ML; G/100ML; G/100ML
1000 INJECTION, SOLUTION INTRAVENOUS ONCE
Status: COMPLETED | OUTPATIENT
Start: 2022-05-28 | End: 2022-05-28

## 2022-05-28 RX ORDER — ONDANSETRON 4 MG/1
4 TABLET, FILM COATED ORAL EVERY 6 HOURS
Qty: 20 TABLET | Refills: 0 | Status: SHIPPED | OUTPATIENT
Start: 2022-05-28

## 2022-05-28 RX ORDER — CEPHALEXIN 500 MG/1
500 CAPSULE ORAL 3 TIMES DAILY
Qty: 30 CAPSULE | Refills: 0 | Status: SHIPPED | OUTPATIENT
Start: 2022-05-28 | End: 2022-06-07

## 2022-05-28 RX ADMIN — ONDANSETRON 4 MG: 2 INJECTION INTRAMUSCULAR; INTRAVENOUS at 11:15

## 2022-05-28 RX ADMIN — SODIUM CHLORIDE, POTASSIUM CHLORIDE, SODIUM LACTATE AND CALCIUM CHLORIDE 1000 ML: 600; 310; 30; 20 INJECTION, SOLUTION INTRAVENOUS at 11:15

## 2022-05-28 RX ADMIN — MORPHINE SULFATE 4 MG: 4 INJECTION INTRAVENOUS at 14:32

## 2022-05-28 RX ADMIN — ONDANSETRON 4 MG: 2 INJECTION INTRAMUSCULAR; INTRAVENOUS at 13:54

## 2022-05-28 RX ADMIN — CEFTRIAXONE 1000 MG: 1 INJECTION, POWDER, FOR SOLUTION INTRAMUSCULAR; INTRAVENOUS at 11:14

## 2022-05-28 ASSESSMENT — ENCOUNTER SYMPTOMS
TROUBLE SWALLOWING: 0
CONSTIPATION: 0
WHEEZING: 0
BACK PAIN: 0
DIARRHEA: 0
ABDOMINAL PAIN: 0
COUGH: 0
EYE ITCHING: 0
SHORTNESS OF BREATH: 0
EYE PAIN: 0
SINUS PAIN: 0
VOMITING: 1
NAUSEA: 1
EYE REDNESS: 0

## 2022-05-28 ASSESSMENT — PAIN - FUNCTIONAL ASSESSMENT: PAIN_FUNCTIONAL_ASSESSMENT: NONE - DENIES PAIN

## 2022-05-28 ASSESSMENT — PAIN DESCRIPTION - LOCATION: LOCATION: PELVIS

## 2022-05-28 ASSESSMENT — PAIN SCALES - GENERAL: PAINLEVEL_OUTOF10: 9

## 2022-05-28 NOTE — ED NOTES
I have reviewed discharge instructions with the patient. The patient verbalized understanding. Patient left ED via Discharge Method: wheelchair to Home with Family. Opportunity for questions and clarification provided. Patient given 3 scripts. To continue your aftercare when you leave the hospital, you may receive an automated call from our care team to check in on how you are doing. This is a free service and part of our promise to provide the best care and service to meet your aftercare needs.  If you have questions, or wish to unsubscribe from this service please call [phone]. Thank you for Choosing our [facility] Emergency Department.       Yany Dubon RN  05/28/22 5783

## 2022-05-28 NOTE — ED NOTES
Pt had refused morphine, morphine was return by Steph Denton, pt chnaged her mind, morphine was pulled by this RN and given. Pain 9/10 prior to morpine, RR 18, 02 95% RA.      Sara Corcoran, NAIF  05/28/22 9338

## 2022-05-28 NOTE — ED PROVIDER NOTES
[facility] Emergency Department Provider Note                   PCP:                Prasanna Mulligan MD               Age: 68 y.o. Sex: female       ICD-10-CM    1. Acute cystitis without hematuria  N30.00 HYDROcodone-acetaminophen (NORCO) 5-325 MG per tablet   2. Dehydration  E86.0        DISPOSITION Decision To Discharge 05/28/2022 02:05:25 PM       New Prescriptions    CEPHALEXIN (KEFLEX) 500 MG CAPSULE    Take 1 capsule by mouth 3 times daily for 10 days    HYDROCODONE-ACETAMINOPHEN (NORCO) 5-325 MG PER TABLET    Take 1 tablet by mouth every 6 hours as needed for Pain for up to 4 days. Intended supply: 3 days. Take lowest dose possible to manage pain    ONDANSETRON (ZOFRAN) 4 MG TABLET    Take 1 tablet by mouth every 6 hours       Orders Placed This Encounter   Procedures    Culture, Urine    CBC with Auto Differential    Comprehensive Metabolic Panel    Urinalysis with Microscopic    Urinalysis    Urinalysis, Micro        MDM  Number of Diagnoses or Management Options  Acute cystitis without hematuria: established, worsening  Dehydration: new, needed workup  Diagnosis management comments: 9year-old female patient with history of recent UTI.   Urine culture grew out E. coli with indeterminate activity to the course of Macrobid she completed    Recently restarted on Macrobid 1 daily  Presented today with fever nausea vomiting    Currently patient is hemodynamically stable and afebrile    Will check labs, urine, urine culture    Start some IV fluids and Zofran  We will go ahead and give a dose of Rocephin after the urine is obtained       Amount and/or Complexity of Data Reviewed  Clinical lab tests: ordered and reviewed  Tests in the medicine section of CPT®: ordered and reviewed  Decide to obtain previous medical records or to obtain history from someone other than the patient: yes  Obtain history from someone other than the patient: yes  Review and summarize past medical records: yes    Risk of Complications, Morbidity, and/or Mortality  Presenting problems: moderate  Diagnostic procedures: moderate  Management options: moderate  General comments: .jaimiei      Patient Progress  Patient progress: improved       Ricco Bhagat is a 68 y.o. female who presents to the Emergency Department with chief complaint of    Chief Complaint   Patient presents with    Urinary Tract Infection      80-year-old female patient presents to the ER with complaints of fever up to 102 at home this morning. Recently treated for UTI    Records indicate E. coli in culture. Susceptibilities show intermediate susceptibility to the Macrobid that she had completed. She was seen yesterday and due to the fact that her urine still did not appear to be completely clear she was restarted on the Macrobid at 1 pill daily for 14 days    Has had 1 episode of nausea vomiting today    They contacted her primary care doctor who raise concern for dehydration and the possibility for needing IV antibiotics so she presents today for further work-up    The history is provided by the patient and a relative. Urinary Tract Infection  This is a recurrent problem. The current episode started yesterday. The problem occurs constantly. The problem has been gradually worsening. Pertinent negatives include no chest pain, no abdominal pain and no shortness of breath. Exacerbated by: Activity, urination. Nothing relieves the symptoms. She has tried acetaminophen for the symptoms. The treatment provided mild relief. All other systems reviewed and are negative. Review of Systems   Constitutional: Negative for chills, fatigue and fever. HENT: Negative for ear pain, hearing loss, sinus pain, sneezing and trouble swallowing. Eyes: Negative for pain, redness and itching. Respiratory: Negative for cough, shortness of breath and wheezing. Cardiovascular: Negative for chest pain and palpitations. Gastrointestinal: Positive for nausea and vomiting. Negative for abdominal pain, constipation and diarrhea. Endocrine: Negative for polydipsia, polyphagia and polyuria. Genitourinary: Positive for dysuria, frequency and urgency. Negative for flank pain and hematuria. Musculoskeletal: Positive for myalgias. Negative for arthralgias and back pain. Skin: Negative for rash and wound. Allergic/Immunologic: Negative for food allergies and immunocompromised state. Neurological: Negative for dizziness, syncope, speech difficulty and light-headedness. Hematological: Negative for adenopathy. Does not bruise/bleed easily. Psychiatric/Behavioral: Negative for dysphoric mood and self-injury. The patient is not nervous/anxious. All other systems reviewed and are negative.       Past Medical History:   Diagnosis Date    Anemia     Anxiety disorder     Asthma     Chronic kidney disease, stage 3 (HCC)     Claustrophobia     Colon polyps     GERD (gastroesophageal reflux disease)     Gout     History of kidney stones     Hypercholesterolemia     Hypertension     Hypertriglyceridemia     Uncontrolled type 2 diabetes mellitus ([facility] [address]         Past Surgical History:   Procedure Laterality Date    BREAST BIOPSY      x4    CATARACT REMOVAL Bilateral     with lens implant    COLONOSCOPY N/A 2/27/2018    COLONOSCOPY/ 35 performed [facility] ENDOSCOPY    COLONOSCOPY  02/27/2018    CYST INCISION AND DRAINAGE Left     x 3    LYMPHADENECTOMY      OTHER SURGICAL HISTORY      Lymph node biopsy (cervical/supraclavicular)    JD AND BSO  age 40   [facility] UPPER GASTROINTESTINAL ENDOSCOPY  02/27/2018        Family History   Problem Relation Age of Onset    Diabetes Brother     Elevated Lipids Brother     Heart Disease Brother         no MI- stents/ several    Cancer Father     Stroke Mother            Social Connections:     Frequency of Communication with Friends and Family: Not on file    Frequency of Social Gatherings with Friends and Family: Not on file    Attends Anglican Services: Not on file    Active Member of Clubs or Organizations: Not on file    Attends Club or Organization Meetings: Not on file    Marital Status: Not on file        Allergies   Allergen Reactions    Docosahexaenoic Acid (Dha) Other (See Comments)     Weak, blood sugar elevated    Fenofibrate Other (See Comments)     No energy    Pravastatin Other (See Comments)     Muscle aches, constipation     Rosuvastatin Other (See Comments)     welps on face    Naproxen Sodium Other (See Comments)     Reduced kidney function    Simvastatin Other (See Comments)     Myalgias, fatigue    Insulin Lispro Itching and Rash        Vitals signs and nursing note reviewed. No data found. Physical Exam  Vitals and nursing note reviewed. Constitutional:       General: She is in acute distress. Appearance: Normal appearance. She is normal weight. HENT:      Head: Normocephalic and atraumatic. Right Ear: External ear normal.      Left Ear: External ear normal.      Nose: Nose normal.      Mouth/Throat:      Mouth: Mucous membranes are moist.      Pharynx: Oropharynx is clear. Eyes:      General: No scleral icterus. Extraocular Movements: Extraocular movements intact. Conjunctiva/sclera: Conjunctivae normal.      Pupils: Pupils are equal, round, and reactive to light. Cardiovascular:      Rate and Rhythm: Normal rate and regular rhythm. Pulses: Normal pulses. Heart sounds: Normal heart sounds. Pulmonary:      Effort: Pulmonary effort is normal. No respiratory distress. Breath sounds: Normal breath sounds. Abdominal:      General: Abdomen is flat. Bowel sounds are normal. There is no distension. Palpations: Abdomen is soft. There is no mass. Tenderness: There is abdominal tenderness in the suprapubic area. Musculoskeletal:         General: No deformity or signs of injury. Normal range of motion.       Cervical back: Normal range of motion and neck supple. Skin:     General: Skin is warm and dry. Capillary Refill: Capillary refill takes less than 2 seconds. Neurological:      General: No focal deficit present. Mental Status: She is alert and oriented to person, place, and time. Psychiatric:         Mood and Affect: Mood normal.         Behavior: Behavior normal.         Thought Content: Thought content normal.         Judgment: Judgment normal.          Procedures    Labs Reviewed   CBC WITH AUTO DIFFERENTIAL - Abnormal; Notable for the following components:       Result Value    WBC 11.3 (*)     RBC 3.80 (*)     MCV 98.7 (*)     Platelets 168 (*)     Seg Neutrophils 93 (*)     Lymphocytes 1 (*)     Eosinophils % 0 (*)     Segs Absolute 10.6 (*)     Absolute Lymph # 0.1 (*)     All other components within normal limits   COMPREHENSIVE METABOLIC PANEL - Abnormal; Notable for the following components:    Sodium 135 (*)     CO2 18 (*)     Anion Gap 18 (*)     Glucose 251 (*)     BUN 34 (*)     CREATININE 2.08 (*)     GFR  30 (*)     GFR Non- 25 (*)     All other components within normal limits   URINALYSIS - Abnormal; Notable for the following components:    Specific Gravity, UA 1.025 (*)     Protein,  (*)     Ketones, Urine TRACE (*)     Bilirubin Urine SMALL (*)     Blood, Urine SMALL (*)     Leukocyte Esterase, Urine SMALL (*)     All other components within normal limits   URINALYSIS, MICRO - Abnormal; Notable for the following components:    BACTERIA, URINE 2+ (*)     All other components within normal limits   CULTURE, URINE   URINALYSIS WITH MICROSCOPIC        No orders to display                                  Voice dictation software was used during the making of this note. This software is not perfect and grammatical and other typographical errors may be present. This note has not been completely proofread for errors.      Edilia Devlin MD  05/28/22 7167

## 2022-05-28 NOTE — ED TRIAGE NOTES
Patient was diagnosed with a UTI last week. Has completed course of ABX. Now with same s/s , fever, N/V/D, Intermittently. Denies abdominal pain. No confusion. Generalized weakness.

## 2022-05-30 LAB
BACTERIA SPEC CULT: NORMAL
SERVICE CMNT-IMP: NORMAL

## 2022-07-25 ENCOUNTER — HOSPITAL ENCOUNTER (OUTPATIENT)
Dept: PHYSICAL THERAPY | Age: 77
Setting detail: RECURRING SERIES
Discharge: HOME | End: 2022-07-28
Payer: MEDICARE

## 2022-07-25 PROCEDURE — 97140 MANUAL THERAPY 1/> REGIONS: CPT

## 2022-07-25 ASSESSMENT — PAIN SCALES - GENERAL: PAINLEVEL_OUTOF10: 0

## 2022-07-25 NOTE — PROGRESS NOTES
Atanacio Ormond  : 1945  Primary: Medicare Part A And B  Secondary: [facility]  [address]  [address] [address]  Phone: [phone]  Fax: [phone] Plan Frequency: 1x/week for 12 weeks    Plan of Care/Certification Expiration Date: 22      PT Visit Info: Total # of Visits to Date: 2      OUTPATIENT PHYSICAL THERAPY:OP NOTE TYPE: Treatment Note 2022       Episode   Appt Desk       Treatment Diagnosis:  Lymphedema, not elsewhere classified (I89.0)  Medical/Referring Diagnosis:  Localized edema [R60.0]  Lymphedema, not elsewhere classified [I89.0]  Referring Physician:  Nikhil Veloz MD MD Orders:  PT Eval and Treat lymphedema  Date of Onset:  Onset Date: 21     Allergies:  Docosahexaenoic acid (dha), Fenofibrate, Pravastatin, Rosuvastatin, Naproxen sodium, Simvastatin, and Insulin lispro  Restrictions/Precautions:    Restrictions/Precautions: None  No data recorded   Interventions Planned (Treatment may consist of any combination of the following):    Current Treatment Recommendations: Manual lymphatic drainage; Home exercise program; Strengthening     Subjective Comments:     Initial:     0/10 Post Session:     0/10  Medications Last Reviewed:  2022  Updated Objective Findings:   as below  DATE  22      RIGHT LEFT RIGHT LEFT   MCP  18.6     WRIST  15.7     10 cm  23.3     20 cm  28     30 cm  36.1     40 cm  36.5     50 cm                  Treatment   THERAPEUTIC EXERCISE: ( minutes):    Exercises per grid below to improve strength. Required minimal verbal cues to  use correct resistance due to possible risk for lymphedema . Progressed resistance as indicated. Reviewed below exercises. Given a new copy. She will begin with a 2# weight. MANUAL THERAPY: (  30 minutes): Complex decongestive physiotherapy was utilized and necessary because of the patient's  risk for edema . Girth assessed. Tissue pliable. Girth is decreased. Manual Lymph Drainage: not performed  Lymph Nodes:    Cervical Supraclavicular Axillary Abdominal Inguinal Popliteal Antecubital   RIGHT []     []     []     []     []     []     []       LEFT []     []     []     []     []     []     []         Anastamoses:   Axillo-axillary Inguino-inguinal Axillo-inguinal Inguino-axillary   ANTERIOR []     []     []     []       POSTERIOR []     []     []     []       RIGHT []     []     []     []       LEFT []     []     []     []         Limbs:   []    RUE     [x]    LUE     []    RLE    []    LLE    Access Code: [email or web address]  URL: [email or web address]  Date: 05/25/2022  Prepared by: Bob Mane    Exercises  Seated Bicep Curls Supinated with Dumbbells - 1 x daily - 3 x weekly - 1 sets - 10 reps  Seated Shoulder Flexion with Dumbbells - 1 x daily - 3 x weekly - 1 sets - 10 reps  Seated Shoulder Abduction with Dumbbells - Thumbs Up - 1 x daily - 3 x weekly - 1 sets - 10 reps  Standing Bent Over Single Arm Scapular Row with Table Support - 1 x daily - 3 x weekly - 1 sets - 10 reps  Bent Over Tricep Extension with Counter Support - 1 x daily - 3 x weekly - 1 sets - 10 reps    Treatment/Session Summary:    Treatment Assessment: the patient has an increase in the size of her left upper extremity in comparison to her right. The girth is slightly less today than 2 months ago. She has been unable to initiate her exercise program due to having had an ongoing UTI. She reports she has no pain. She reports her rings do not feel tight. The tissue is very soft and does not appear to have fluid. There may be a difference in adipose tissue deposition rather than fluid. I do not feel a compression garment is needed at this point. Communication/Consultation:  Spoke with Mrs. Nitesh Tracey in regards to beginning a strengtheing program for her upper extremities. .   She will initiate a strengthening program and return for an assessment of girth.   Equipment provided today:  None  Recommendations/Intent for next treatment session: Next visit will focus on assessing girth and possible signs of lymphedema.     Total Treatment Billable Duration:  30 minutes  Time In: 2336  Time Out: 1025    JOAQUIN BLACKWOOD, PT       Post Session Pain  Charge Capture  [employer] Portal  MD Guidelines  Scanned Media  Benefits  MyChart

## 2022-10-25 ENCOUNTER — HOSPITAL ENCOUNTER (OUTPATIENT)
Dept: PHYSICAL THERAPY | Age: 77
Setting detail: RECURRING SERIES
Discharge: HOME | End: 2022-10-28
Payer: MEDICARE

## 2022-10-25 PROCEDURE — 97140 MANUAL THERAPY 1/> REGIONS: CPT

## 2022-10-25 ASSESSMENT — PAIN SCALES - GENERAL: PAINLEVEL_OUTOF10: 0

## 2022-10-25 NOTE — PLAN OF CARE
Jeffery Hanavneet  : 1945  Primary: Medicare Part A And B  Secondary: [facility]  [address] [facility] [address]  [address] [place] [address] Way [address]  Phone: [phone]  Fax: [phone] Plan Frequency: 1x/week for 10 weeks    Plan of Care/Certification Expiration Date: 10/31/22      PT Visit Info: Total # of Visits to Date: 2      OUTPATIENT PHYSICAL THERAPY:OP NOTE TYPE: Recertification and Discharge Summary 10/25/2022               Episode  Appt Desk         Treatment Diagnosis:  Lymphedema, not elsewhere classified (I89.0)  * No diagnoses found *  Medical/Referring Diagnosis:  Localized edema [R60.0]  Lymphedema, not elsewhere classified [I89.0]  Referring Physician:  Sia Hernandez MD MD Orders:  PT Eval and Treat lymphedema assessment  Return MD Appt:     Date of Onset:  Onset Date: 21     Allergies:  Docosahexaenoic acid (dha), Fenofibrate, Pravastatin, Rosuvastatin, Naproxen sodium, Simvastatin, and Insulin lispro  Restrictions/Precautions:    Restrictions/Precautions: None  No data recorded   Medications Last Reviewed:  10/25/2022     SUBJECTIVE   History of Injury/Illness (Reason for Referral): The patient has had an increased girth of her left upper extremity. She would like to have it assessed to rule out lymphedema. Patient Stated Goal(s):  \"the patient would like to exercises without causing an increase in girth\"  Initial:     010 Post Session:     010  Past Medical History/Comorbidities:   Ms. Angela Vega  has a past medical history of Anemia, Anxiety disorder, Asthma, Chronic kidney disease, stage 3 ([address] Claustrophobia, Colon polyps, GERD (gastroesophageal reflux disease), Gout, History of kidney stones, Hypercholesterolemia, Hypertension, Hypertriglyceridemia, and Uncontrolled type 2 diabetes mellitus ([address] Ms. Angela Vega  has a past surgical history that includes Upper gastrointestinal endoscopy (2018); Cataract removal (Bilateral);  Breast cyst incision and drainage (Left); other surgical history; Breast biopsy; lymphadenectomy; Colonoscopy (N/A, 2/27/2018); Colonoscopy (02/27/2018); and Total abdominal hysterectomy w/ bilateral salpingoophorectomy (age 40). Past Medical History:   Diagnosis Date    Anemia     Anxiety disorder     Asthma     Chronic kidney disease, stage 3 (HCC)     Claustrophobia     Colon polyps     GERD (gastroesophageal reflux disease)     Gout     History of kidney stones     Hypercholesterolemia     Hypertension     Hypertriglyceridemia     Uncontrolled type 2 diabetes mellitus (HCC)      Past Surgical History:   Procedure Laterality Date    BREAST BIOPSY      x4    BREAST CYST INCISION AND DRAINAGE Left     x 3    CATARACT REMOVAL Bilateral     with lens implant    COLONOSCOPY N/A 2/27/2018    COLONOSCOPY/ 35 performed by Dayna Rose MD at [facility] ENDOSCOPY    COLONOSCOPY  02/27/2018    LYMPHADENECTOMY      OTHER SURGICAL HISTORY      Lymph node biopsy (cervical/supraclavicular)    TOTAL ABDOMINAL HYSTERECTOMY W/ BILATERAL SALPINGOOPHORECTOMY  age 40    UPPER GASTROINTESTINAL ENDOSCOPY  02/27/2018       Social History/Living Environment:   Lives With: Spouse     Prior Level of Function/Work/Activity:   Prior level of function: independent  Prior level of function: Independent  Current level of function: independent  Occupation: Retired  No data recordedNo data recorded   Learning:   Does the patient/guardian have any barriers to learning?: No barriers  Will there be a co-learner?: No  What is the preferred language of the patient/guardian?: English  Is an  required?: No  How does the patient/guardian prefer to learn new concepts?: Listening; Reading; Demonstration; Pictures/Videos     Fall Risk Scale:    Richey Total Score: 0  Richey Fall Risk: Low (0-24)     Dominant Side:  right handed        OBJECTIVE   Lymphedema:  Girth Measurements:  DATE 10/25/22 10/25/22      RIGHT LEFT RIGHT LEFT   MCP 18.8 18.5 WRIST 15.5 15.4     10 cm 21 23.2     20 cm 25.2 27.5     30 cm 30.8 36.2     40 cm 35 36.1     50 cm                  ASSESSMENT   Initial Assessment:  The patient presents for a lymphedema assessment of the left upper extremity. She has noticed she has fullness in this region. She has previously been assessed for lymphedema in the past (discharged 10/21). She would like to be assessed again. She has increased girth measurements when compared to the right upper extremity. Girth is stable when compared to the left upper extremity from 10/21. Tissue is fleshy and does not feel edematous. She appears to have increased adipose tissue as opposed to edema in this arm. She is concerned with her loss of muscle tone. She will benefit from a strengthening program.  She was agiven a written program.  She will initiate this and return for a girth check. 10/25/22:  the patient returns having not been able to initiate an exercise program.  She is interested in girth measurements. Girth assessed and is stable. She has very loose, soft tissue with no evidence of edema. She appears to have a difference in adipose tissue deposition in her arms. The left arm does not appear to have lymphedema. No intervention planned at this time. Problem List: (Impacting functional limitations): Body Structures, Functions, Activity Limitations Requiring Skilled Therapeutic Intervention: Decreased functional mobility ;  Decreased strength     Therapy Prognosis:   Therapy Prognosis: Good     Assessment Complexity:   Low Complexity  PLAN   Effective Dates: 8/23/22 TO Plan of Care/Certification Expiration Date: 10/31/22     Frequency/Duration: Plan Frequency: 1x/week for 10 weeks     Interventions Planned (Treatment may consist of any combination of the following):    Current Treatment Recommendations: Home exercise program     Goals: (Goals have been discussed and agreed upon with patient.)  Short-Term Functional Goals: Time Frame: 4 weeks  The patient will have knowledge of signs and symptoms of lymphedema and how to manage within 4 weeks. Met   The patient will be independent with HEP for strength within 4 weeks. Met   Discharge Goals: Time Frame: 8 weeks   The patient will gain 1/2 grade strength of bilateral upper extremities within 8 weeks. Ongoing   The patient will be fit with a compression garment as need for exercises dependent on her tolerance to exercise within 8 weeks. Ongoing   Girth will remain stable when participating in an exercise program within 8 weeks. Met          Outcome Measure: Tool Used: Disabilities of the Arm, Shoulder and Hand (DASH) Questionnaire - Quick Version  Score:  Initial: 12/55  Most Recent: X/55 (Date: -- )   Interpretation of Score: The DASH is designed to measure the activities of daily living in person's with upper extremity dysfunction or pain. Each section is scored on a 1-5 scale, 5 representing the greatest disability. The scores of each section are added together for a total score of 55. Tool Used: ECOG Performance Survey Score  Score:  Initial: 0 Most Recent: 0   Interpretation of Score:   0 Fully active, able to carry on all pre-disease performance without restriction   1 Restricted in physically strenuous activity but ambulatory and able to carry out work of a light or sedentary nature, e.g., light house work, office work   2 Ambulatory and capable of all selfcare but unable to carry out any work activities. Up and about more than 50% of waking hours   3 Capable of only limited selfcare, confined to bed or chair more than 50% of waking hours   4 Completely disabled. Cannot carry on any selfcare. Totally confined to bed or chair   5 Dead       Tool Used: Lymphedema Life Impact Scale   Score:  Initial: 7 Most Recent: 12 (Date: 10/25/22)   Interpretation of Score:  The Lymphedema Life Impact Scale (LLIS) is a validated instrument that measures the physical, functional, and psychosocial concerns pertinent to patients with extremity lymphedema. The Scale's questionnaire is administered to patients to gauge impairments, activity limitations, and participation restrictions resulting from their lymphedema. Medical Necessity:   Patient is expected to demonstrate progress in strength and edema management to reduce risk of cellulitis. Reason For Services/Other Comments:  Patient continues to demonstrate capacity to improve strength and edema management which will reduce risk of cellulitis. Total Duration:  Time In: 8863  Time Out: 1031    Regarding [address]'s therapy, I certify that the treatment plan above will be carried out by a therapist or under their direction.   Thank you for this referral,  Ji Snyder PT     Referring Physician Signature: Alexander Tavarez MD _______________________________ Date _____________        Post Session Pain  Charge Capture   POC Link  Treatment Note Link  MD Guidelines  [facility]

## 2022-10-25 NOTE — PROGRESS NOTES
Giana Christensen  : 1945  Primary: Medicare Part A And B  Secondary: [facility]  [address] [facility] [address]  [address] [place] [address] Way [address]  Phone: [phone]  Fax: [phone] Plan Frequency: 1x/week for 10 weeks    Plan of Care/Certification Expiration Date: 10/31/22      PT Visit Info: Total # of Visits to Date: 2      OUTPATIENT PHYSICAL THERAPY:OP NOTE TYPE: Treatment Note 10/25/2022       Episode   Appt Desk       Treatment Diagnosis:  Lymphedema, not elsewhere classified (I89.0)  Medical/Referring Diagnosis:  Localized edema [R60.0]  Lymphedema, not elsewhere classified [I89.0]  Referring Physician:  Merissa Arteaga MD MD Orders:  PT Eval and Treat lymphedema  Date of Onset:  Onset Date: 21     Allergies:  Docosahexaenoic acid (dha), Fenofibrate, Pravastatin, Rosuvastatin, Naproxen sodium, Simvastatin, and Insulin lispro  Restrictions/Precautions:    Restrictions/Precautions: None  No data recorded   Interventions Planned (Treatment may consist of any combination of the following):    Current Treatment Recommendations: Home exercise program     Subjective Comments:  the patient reports she would like to keep a check on the measurements and return in 6 months  Initial:     0/10 Post Session:     0/10  Medications Last Reviewed:  10/25/2022  Updated Objective Findings:   as below  DATE  7/25/22  10/25/22    RIGHT LEFT RIGHT LEFT   MCP  18.6  18.5   WRIST  15.7  15.4   10 cm  23.3  23.2   20 cm  28  27.5   30 cm  36.1  36.2   40 cm  36.5  36.1   50 cm                  Treatment   THERAPEUTIC EXERCISE: ( minutes):    Exercises per grid below to improve strength. Required minimal verbal cues to  use correct resistance due to possible risk for lymphedema . Progressed resistance as indicated. She will begin with a 2# weight. MANUAL THERAPY: (  44 minutes):    Complex decongestive physiotherapy was utilized and necessary because of the patient's  risk for edema .   Girth assessed. Tissue pliable. Girth is decreased. Given stretches to perform daily:  chicken wing, sword of hope, scapular retraction. She will initiate strengthening exercises soon. Access Code: [email or web address]  URL: [email or web address]  Date: 05/25/2022  Prepared by: Ivanna Lombardo    Exercises  Seated Bicep Curls Supinated with Dumbbells - 1 x daily - 3 x weekly - 1 sets - 10 reps  Seated Shoulder Flexion with Dumbbells - 1 x daily - 3 x weekly - 1 sets - 10 reps  Seated Shoulder Abduction with Dumbbells - Thumbs Up - 1 x daily - 3 x weekly - 1 sets - 10 reps  Standing Bent Over Single Arm Scapular Row with Table Support - 1 x daily - 3 x weekly - 1 sets - 10 reps  Bent Over Tricep Extension with Counter Support - 1 x daily - 3 x weekly - 1 sets - 10 reps    Treatment/Session Summary:    Treatment Assessment: the patient has an increase in the size of her left upper extremity in comparison to her right. The girth is stable today as compared to 2 months ago. She has been unable to initiate her exercise program due to multiple issues. She reports she has no pain. She reports her rings do not feel tight. The tissue is very soft and does not appear to have fluid. There may be a difference in adipose tissue deposition rather than fluid. I do not feel a compression garment is needed at this point. She would like to return in 6 months for an assessment of her girth. Communication/Consultation:  Spoke with Mrs. Caden Plata in regards to beginning a strengtheing program for her upper extremities. .     Equipment provided today:  None  Recommendations/Intent for next treatment session: No further visits.     Total Treatment Billable Duration:  44 minutes  Time In: 8860  Time Out: 1031    JOAQUIN BLACKWOOD, PT       Post Session Pain  Charge Capture  [employer] Portal  MD Guidelines  Scanned Media  Benefits  MyChart

## 2023-01-03 ENCOUNTER — APPOINTMENT (RX ONLY)
Dept: URBAN - METROPOLITAN AREA CLINIC 24 | Facility: CLINIC | Age: 78
Setting detail: DERMATOLOGY
End: 2023-01-03

## 2023-01-03 DIAGNOSIS — L82.1 OTHER SEBORRHEIC KERATOSIS: ICD-10-CM

## 2023-01-03 DIAGNOSIS — B07.8 OTHER VIRAL WARTS: ICD-10-CM

## 2023-01-03 DIAGNOSIS — Z71.89 OTHER SPECIFIED COUNSELING: ICD-10-CM

## 2023-01-03 DIAGNOSIS — Z79.01 LONG TERM (CURRENT) USE OF ANTICOAGULANTS: ICD-10-CM

## 2023-01-03 DIAGNOSIS — L72.0 EPIDERMAL CYST: ICD-10-CM

## 2023-01-03 DIAGNOSIS — L81.4 OTHER MELANIN HYPERPIGMENTATION: ICD-10-CM

## 2023-01-03 PROCEDURE — 17110 DESTRUCTION B9 LES UP TO 14: CPT

## 2023-01-03 PROCEDURE — 99213 OFFICE O/P EST LOW 20 MIN: CPT | Mod: 25

## 2023-01-03 PROCEDURE — ? COUNSELING

## 2023-01-03 PROCEDURE — ? LIQUID NITROGEN

## 2023-01-03 ASSESSMENT — LOCATION SIMPLE DESCRIPTION DERM
LOCATION SIMPLE: LEFT SHOULDER
LOCATION SIMPLE: RIGHT THIGH
LOCATION SIMPLE: RIGHT POSTERIOR UPPER ARM
LOCATION SIMPLE: RIGHT SHOULDER
LOCATION SIMPLE: LEFT THIGH
LOCATION SIMPLE: RIGHT UPPER BACK
LOCATION SIMPLE: LEFT LIP
LOCATION SIMPLE: RIGHT LIP

## 2023-01-03 ASSESSMENT — LOCATION DETAILED DESCRIPTION DERM
LOCATION DETAILED: LEFT ANTERIOR SHOULDER
LOCATION DETAILED: RIGHT DISTAL LATERAL POSTERIOR UPPER ARM
LOCATION DETAILED: RIGHT ANTERIOR DISTAL THIGH
LOCATION DETAILED: LEFT POSTERIOR SHOULDER
LOCATION DETAILED: RIGHT LOWER CUTANEOUS LIP
LOCATION DETAILED: LEFT ANTERIOR DISTAL THIGH
LOCATION DETAILED: RIGHT SUPERIOR UPPER BACK
LOCATION DETAILED: RIGHT POSTERIOR SHOULDER
LOCATION DETAILED: LEFT LOWER CUTANEOUS LIP

## 2023-01-03 ASSESSMENT — LOCATION ZONE DERM
LOCATION ZONE: LIP
LOCATION ZONE: ARM
LOCATION ZONE: TRUNK
LOCATION ZONE: LEG

## 2023-01-03 NOTE — PROCEDURE: LIQUID NITROGEN
Post-Care Instructions: I reviewed with the patient in detail post-care instructions. Patient is to wear sunprotection, and avoid picking at any of the treated lesions. Pt may apply Vaseline to crusted or scabbing areas.
Show Aperture Variable?: Yes
Render Post-Care Instructions In Note?: no
Medical Necessity Information: It is in your best interest to select a reason for this procedure from the list below. All of these items fulfill various CMS LCD requirements except the new and changing color options.
Spray Paint Text: The liquid nitrogen was applied to the skin utilizing a spray paint frosting technique.
Medical Necessity Clause: This procedure was medically necessary because the lesions that were treated were:
Consent: The patient's consent was obtained including but not limited to risks of crusting, scabbing, blistering, scarring, darker or lighter pigmentary change, recurrence, incomplete removal and infection.
Detail Level: Detailed

## 2024-01-23 ENCOUNTER — APPOINTMENT (RX ONLY)
Dept: URBAN - METROPOLITAN AREA CLINIC 24 | Facility: CLINIC | Age: 79
Setting detail: DERMATOLOGY
End: 2024-01-23

## 2024-01-23 DIAGNOSIS — L91.8 OTHER HYPERTROPHIC DISORDERS OF THE SKIN: ICD-10-CM

## 2024-01-23 DIAGNOSIS — Z79.01 LONG TERM (CURRENT) USE OF ANTICOAGULANTS: ICD-10-CM

## 2024-01-23 DIAGNOSIS — L30.8 OTHER SPECIFIED DERMATITIS: ICD-10-CM | Status: INADEQUATELY CONTROLLED

## 2024-01-23 DIAGNOSIS — L81.4 OTHER MELANIN HYPERPIGMENTATION: ICD-10-CM

## 2024-01-23 DIAGNOSIS — L82.1 OTHER SEBORRHEIC KERATOSIS: ICD-10-CM

## 2024-01-23 DIAGNOSIS — Z71.89 OTHER SPECIFIED COUNSELING: ICD-10-CM

## 2024-01-23 PROCEDURE — ? COUNSELING: TOPICAL STEROIDS

## 2024-01-23 PROCEDURE — 99214 OFFICE O/P EST MOD 30 MIN: CPT

## 2024-01-23 PROCEDURE — ? PRESCRIPTION MEDICATION MANAGEMENT

## 2024-01-23 PROCEDURE — ? PRESCRIPTION

## 2024-01-23 PROCEDURE — ? COUNSELING

## 2024-01-23 RX ORDER — TRIAMCINOLONE ACETONIDE 1 MG/G
CREAM TOPICAL
Qty: 453.6 | Refills: 2 | Status: ERX | COMMUNITY
Start: 2024-01-23

## 2024-01-23 RX ADMIN — TRIAMCINOLONE ACETONIDE: 1 CREAM TOPICAL at 00:00

## 2024-01-23 ASSESSMENT — LOCATION DETAILED DESCRIPTION DERM
LOCATION DETAILED: LEFT POSTERIOR SHOULDER
LOCATION DETAILED: RIGHT INFERIOR MEDIAL UPPER BACK
LOCATION DETAILED: LEFT DISTAL PRETIBIAL REGION
LOCATION DETAILED: LEFT ANTERIOR DISTAL THIGH
LOCATION DETAILED: LEFT ANTERIOR SHOULDER
LOCATION DETAILED: RIGHT SUPERIOR UPPER BACK
LOCATION DETAILED: RIGHT DISTAL PRETIBIAL REGION

## 2024-01-23 ASSESSMENT — LOCATION ZONE DERM
LOCATION ZONE: TRUNK
LOCATION ZONE: LEG
LOCATION ZONE: ARM

## 2024-01-23 ASSESSMENT — LOCATION SIMPLE DESCRIPTION DERM
LOCATION SIMPLE: LEFT SHOULDER
LOCATION SIMPLE: RIGHT UPPER BACK
LOCATION SIMPLE: LEFT PRETIBIAL REGION
LOCATION SIMPLE: RIGHT PRETIBIAL REGION
LOCATION SIMPLE: LEFT THIGH

## 2024-01-23 NOTE — PROCEDURE: PRESCRIPTION MEDICATION MANAGEMENT
Render In Strict Bullet Format?: No
Initiate Treatment: Triamcinolone 0.1% cream BID up to 2 weeks PRN for flares \\nOTC CeraVe anti itch cream BID\\nAvoid hot showers
Detail Level: Zone

## 2025-05-13 ENCOUNTER — APPOINTMENT (OUTPATIENT)
Dept: URBAN - METROPOLITAN AREA CLINIC 24 | Facility: CLINIC | Age: 80
Setting detail: DERMATOLOGY
End: 2025-05-13

## 2025-05-13 DIAGNOSIS — D485 NEOPLASM OF UNCERTAIN BEHAVIOR OF SKIN: ICD-10-CM

## 2025-05-13 DIAGNOSIS — L82.1 OTHER SEBORRHEIC KERATOSIS: ICD-10-CM

## 2025-05-13 PROBLEM — D48.5 NEOPLASM OF UNCERTAIN BEHAVIOR OF SKIN: Status: ACTIVE | Noted: 2025-05-13

## 2025-05-13 PROCEDURE — 99212 OFFICE O/P EST SF 10 MIN: CPT | Mod: 25

## 2025-05-13 PROCEDURE — ? COUNSELING

## 2025-05-13 PROCEDURE — ? BIOPSY BY SHAVE METHOD

## 2025-05-13 PROCEDURE — 11102 TANGNTL BX SKIN SINGLE LES: CPT

## 2025-05-13 ASSESSMENT — LOCATION ZONE DERM
LOCATION ZONE: FACE
LOCATION ZONE: ARM

## 2025-05-13 ASSESSMENT — LOCATION SIMPLE DESCRIPTION DERM
LOCATION SIMPLE: RIGHT SHOULDER
LOCATION SIMPLE: RIGHT CHEEK

## 2025-05-13 ASSESSMENT — LOCATION DETAILED DESCRIPTION DERM
LOCATION DETAILED: RIGHT POSTERIOR SHOULDER
LOCATION DETAILED: RIGHT MEDIAL MALAR CHEEK

## 2025-05-13 NOTE — PROCEDURE: BIOPSY BY SHAVE METHOD
Detail Level: Detailed
Depth Of Biopsy: dermis
Was A Bandage Applied: Yes
Size Of Lesion In Cm: 0
Biopsy Type: H and E
Biopsy Method: Dermablade
Anesthesia Type: 1% lidocaine with epinephrine
Anesthesia Volume In Cc: 0.5
Hemostasis: Drysol
Wound Care: Petrolatum
Dressing: bandage
Destruction After The Procedure: No
Type Of Destruction Used: Curettage
Curettage Text: The wound bed was treated with curettage after the biopsy was performed.
Cryotherapy Text: The wound bed was treated with cryotherapy after the biopsy was performed.
Electrodesiccation Text: The wound bed was treated with electrodesiccation after the biopsy was performed.
Electrodesiccation And Curettage Text: The wound bed was treated with electrodesiccation and curettage after the biopsy was performed.
Silver Nitrate Text: The wound bed was treated with silver nitrate after the biopsy was performed.
Lab: [id number]
Lab Facility: [id number]
Medical Necessity Information: It is in your best interest to select a reason for this procedure from the list below. All of these items fulfill various CMS LCD requirements except the new and changing color options.
Consent: Written consent was obtained and risks were reviewed including but not limited to scarring, infection, bleeding.
Post-Care Instructions: I reviewed with the patient in detail post-care instructions. Patient is to keep the biopsy site dry overnight, and then apply vaseline twice daily until healed while covering with a bandage.
Notification Instructions: Patient will be notified of biopsy results. However, patient instructed to call the office if not contacted within 2 weeks.
Billing Type: Third-Party Bill
Information: Selecting Yes will display possible errors in your note based on the variables you have selected. This validation is only offered as a suggestion for you. PLEASE NOTE THAT THE VALIDATION TEXT WILL BE REMOVED WHEN YOU FINALIZE YOUR NOTE. IF YOU WANT TO FAX A PRELIMINARY NOTE YOU WILL NEED TO TOGGLE THIS TO 'NO' IF YOU DO NOT WANT IT IN YOUR FAXED NOTE.

## (undated) DEVICE — NDL PRT INJ NSAF BLNT 18GX1.5 --

## (undated) DEVICE — CONNECTOR TBNG OD5-7MM O2 END DISP

## (undated) DEVICE — FORCEPS BX L240CM JAW DIA2.8MM L CAP W/ NDL MIC MESH TOOTH

## (undated) DEVICE — KENDALL RADIOLUCENT FOAM MONITORING ELECTRODE RECTANGULAR SHAPE: Brand: KENDALL

## (undated) DEVICE — CONTAINER PREFIL FRMLN 40ML --

## (undated) DEVICE — SYR 3ML LL TIP 1/10ML GRAD --

## (undated) DEVICE — BLOCK BITE AD 60FR W/ VELC STRP ADDRESSES MOST PT AND

## (undated) DEVICE — CANNULA NSL ORAL AD FOR CAPNOFLEX CO2 O2 AIRLFE

## (undated) DEVICE — SYR 5ML 1/5 GRAD LL NSAF LF --